# Patient Record
Sex: FEMALE | Employment: FULL TIME | URBAN - METROPOLITAN AREA
[De-identification: names, ages, dates, MRNs, and addresses within clinical notes are randomized per-mention and may not be internally consistent; named-entity substitution may affect disease eponyms.]

---

## 2017-04-19 ENCOUNTER — OFFICE VISIT (OUTPATIENT)
Dept: FAMILY MEDICINE | Facility: CLINIC | Age: 42
End: 2017-04-19

## 2017-04-19 VITALS
OXYGEN SATURATION: 100 % | HEIGHT: 62 IN | SYSTOLIC BLOOD PRESSURE: 126 MMHG | HEART RATE: 68 BPM | RESPIRATION RATE: 16 BRPM | WEIGHT: 166.2 LBS | DIASTOLIC BLOOD PRESSURE: 85 MMHG | BODY MASS INDEX: 30.59 KG/M2 | TEMPERATURE: 98.3 F

## 2017-04-19 DIAGNOSIS — R63.5 ABNORMAL WEIGHT GAIN: Primary | ICD-10-CM

## 2017-04-19 LAB — HBA1C MFR BLD: 5.1 % (ref 4.1–5.7)

## 2017-04-19 ASSESSMENT — ENCOUNTER SYMPTOMS
ROS SKIN COMMENTS: DRY SKIN
ENDOCRINE NEGATIVE: 1
APPETITE CHANGE: 0
HEMATOLOGIC/LYMPHATIC NEGATIVE: 1
RESPIRATORY NEGATIVE: 1
PSYCHIATRIC NEGATIVE: 1
NEUROLOGICAL NEGATIVE: 1
CARDIOVASCULAR NEGATIVE: 1
CONSTIPATION: 1

## 2017-04-19 NOTE — MR AVS SNAPSHOT
After Visit Summary   4/19/2017    Radha Camargo    MRN: [id number]           Patient Information     Date Of Birth          1975        Visit Information        Provider Department      4/19/2017 4:00 PM Rodrigo Durham MD \[facility]\"" [facility]        Today's Diagnoses     Abnormal weight gain    -  1      Care Instructions    Here is the plan from today's visit    1. Abnormal weight gain     - TSH with free T4 reflex  - CBC with Diff Plt ([facility]); Future  - Hemoglobin A1c (\[facility]\"")  - Comprehensive Metabolic Panel ([facility])      Please call or return to clinic if your symptoms don't go away.    Follow up plan  Please make a clinic appointment for follow up with me (RODRIGO DURHAM) in 2-4  weeks for reviewing labs.    Thank you for coming to [facility] today.  Lab Testing:  **If you had lab testing today and your results are reassuring or normal they will be mailed to you or sent through [employer] within 7 days.   **If the lab tests need quick action we will call you with the results.  The phone number we will call with results is # [phone] (home) . If this is not the best number please call our clinic and change the number.  Medication Refills:  If you need any refills please call your pharmacy and they will contact us.   If you need to  your refill at a new pharmacy, please contact the new pharmacy directly. The new pharmacy will help you get your medications transferred faster.   Scheduling:  If you have any concerns about today's visit or wish to schedule another appointment please call our office during normal business hours [phone] (8-5:00 M-F)  If a referral was made to a [facility] and you don't get a call from central scheduling please call [phone].  If a Mammogram was ordered for you at [facility] call [phone] to schedule or change your appointment.  If you had an XRay/CT/Ultrasound/MRI ordered the number is  [phone] to schedule or change your radiology appointment.   Medical Concerns:  If you have urgent medical concerns please call [phone] at any time of the day.          Follow-ups after your visit        Follow-up notes from your care team     Return in about 2 weeks (around 5/3/2017).      Future tests that were ordered for you today     Open Future Orders        Priority Expected Expires Ordered    CBC with Diff Plt ([facility]) Routine  4/19/2018 4/19/2017            Who to contact     Please call your clinic at [phone] to:    Ask questions about your health    Make or cancel appointments    Discuss your medicines    Learn about your test results    Speak to your doctor   If you have compliments or concerns about an experience at your clinic, or if you wish to file a complaint, please contact [facility] Patient Relations at [phone] or email us at [email or web address]         Additional Information About Your Visit        [employer] Information     [employer] gives you secure access to your electronic health record. If you see a primary care provider, you can also send messages to your care team and make appointments. If you have questions, please call your primary care clinic.  If you do not have a primary care provider, please call [phone] and they will assist you.      [employer] is an electronic gateway that provides easy, online access to your medical records. With [employer], you can request a clinic appointment, read your test results, renew a prescription or communicate with your care team.     To access your existing account, please contact your [facility] Clinic or call [phone] for assistance.        Care EveryWhere ID     This is your Care EveryWhere ID. This could be used by other organizations to access your [facility] medical records  [id number]        Your Vitals Were     Pulse Temperature Respirations Height Last Period  "Pulse Oximetry    68 98.3  F (36.8  C) (Oral) 16 5' 2\" (157.5 cm) 04/11/2017 100%    BMI (Body Mass Index)                   30.4 kg/m2            Blood Pressure from Last 3 Encounters:   04/19/17 126/85    Weight from Last 3 Encounters:   04/19/17 166 lb 3.2 oz (75.4 kg)              We Performed the Following     Comprehensive Metabolic Panel ([facility])     Hemoglobin A1c ([place])     TSH with free T4 reflex        Primary Care Provider Office Phone # Fax #    Rodrigo Durham [phone]       [facility] [address] [address]        Thank you!     Thank you for choosing [facility]  for your care. Our goal is always to provide you with excellent care. Hearing back from our patients is one way we can continue to improve our services. Please take a few minutes to complete the written survey that you may receive in the mail after your visit with us. Thank you!             Your Updated Medication List - Protect others around you: Learn how to safely use, store and throw away your medicines at www.disposemymeds.org.      Notice  As of 4/19/2017  9:03 PM    You have not been prescribed any medications.      "

## 2017-04-19 NOTE — PROGRESS NOTES
"      HPI:       Radha Camargo is a  41 year old who presents for the following  Patient presents with:  thyroid: thyroid check, weight gaining concern    Radha has gained about 46 lb over the past 1 year (her normal wt was 120lb) despite working out a hour/day 5 days/wk. She endorsed leg cramp, constipation, dry skin and hair and fatigue despite having enough sleep at night. She has family history of obesity and hypothyroidism.     She reported healthy diet with a lot of fish. However, she likes and eats a lot of pasta.     She also endorsed irregular and heavy periods every 2-3 weeks and excess facial fair. Denied abdominal pain.     Problem, Medication and Allergy Lists were reviewed and are current.  Patient is a new patient to this clinic and so  I reviewed/updated the Past Medical History, the Family History and the Social History.   No medications   Family History     Problem (# of Occurrences) Relation (Name,Age of Onset)    Anxiety Disorder (1) Mother    Breast Cancer (1) Mother    Colon Cancer (1) Brother    Coronary Artery Disease (1) Paternal Grandmother    DIABETES (1) Paternal Grandfather    Depression (1) Mother    Hypertension (1) Mother    Obesity (3) Mother, Maternal Grandmother, Cousin    Other Cancer (2) Mother, Sister    Thyroid Disease (2) Maternal Grandmother, Cousin    Unknown/Adopted (1) Paternal Grandfather               Review of Systems:   Review of Systems   Constitutional: Negative for appetite change.   HENT: Negative.    Respiratory: Negative.    Cardiovascular: Negative.    Gastrointestinal: Positive for constipation.   Endocrine: Negative.    Genitourinary: Negative.    Skin:        Dry skin   Neurological: Negative.    Hematological: Negative.    Psychiatric/Behavioral: Negative.              Physical Exam:   Patient Vitals for the past 24 hrs:   BP Temp Temp src Pulse Resp SpO2 Height Weight   17 1558 126/85 98.3  F (36.8  C) Oral 68 16 100 % 5' 2\" (157.5 cm) 166 lb 3.2 oz " (75.4 kg)     Body mass index is 30.4 kg/(m^2).  Vitals were reviewed and were normal     Physical Exam   Constitutional: She is oriented to person, place, and time. She appears well-developed.   HENT:   Head: Normocephalic.   Eyes: Conjunctivae are normal. Pupils are equal, round, and reactive to light. No scleral icterus.   Neck: No thyromegaly present.   Cardiovascular: Normal rate, regular rhythm and normal heart sounds.  Exam reveals no gallop.    No murmur heard.  Pulmonary/Chest: [place] normal and breath sounds normal.   Abdominal: Soft. Bowel sounds are normal. There is no tenderness.   Musculoskeletal: Normal range of motion.   Lymphadenopathy:     She has no cervical adenopathy.   Neurological: She is alert and oriented to person, place, and time. No cranial nerve deficit.   Skin: Skin is dry.   Psychiatric: She has a normal mood and affect. Judgment and thought content normal.       Results:      Results from the last 24 hours  Results for orders placed or performed in visit on 04/19/17 (from the past 24 hour(s))   Hemoglobin A1c ([place])   Result Value Ref Range    Hemoglobin A1C 5.1 4.1 - 5.7 %     Assessment and Plan     Radha was seen today for thyroid.    Diagnoses and all orders for this visit: Weight gain, constipation, dry skin and family history support the diagnosis of hypothyroidism. However weight gain can also be caused by high carbohydrate diet.     Abnormal weight gain  -     TSH with free T4 reflex  -     CBC with Diff Plt ([facility]); Future  -     Hemoglobin A1c ([facility])  -     Comprehensive Metabolic Panel ([facility])      There are no discontinued medications.  Options for treatment and follow-up care were reviewed with the patient. Radha Camargo  engaged in the decision making process and verbalized understanding of the options discussed and agreed with the final plan.    Rodrigo Durham MD

## 2017-04-19 NOTE — PROGRESS NOTES
Gain 46 lb over 1 year (normal wgt 120lb)   Leg cramp, constipation, fatigue,     Irregular period, heavy, every 2-3 weeks     LEEP for cervical cancer    Family   Obesity  Hypothyroidism (grandma, aunts)

## 2017-04-20 LAB
ALBUMIN SERPL-MCNC: 4.1 MG/DL (ref 3.8–5)
ALP SERPL-CCNC: 62.2 U/L (ref 31.7–110.5)
ALT SERPL-CCNC: 12.4 U/L (ref 0–45)
AST SERPL-CCNC: 9.9 U/L (ref 0–45)
BILIRUB SERPL-MCNC: 0.5 MG/DL (ref 0.2–1.3)
BUN SERPL-MCNC: 20.5 MG/DL (ref 7–19)
CALCIUM SERPL-MCNC: 9.4 MG/DL (ref 8.5–10.1)
CHLORIDE SERPLBLD-SCNC: 103.3 MMOL/L (ref 98–110)
CO2 SERPL-SCNC: 24.7 MMOL/L (ref 20–32)
CREAT SERPL-MCNC: 0.8 MG/DL (ref 0.5–1)
GFR SERPL CREATININE-BSD FRML MDRD: 84.9 ML/MIN/1.7 M2
GLUCOSE SERPL-MCNC: 87 MG'DL (ref 70–99)
POTASSIUM SERPL-SCNC: 4.1 MMOL/DL (ref 3.3–4.5)
PROT SERPL-MCNC: 7.2 G/DL (ref 6.8–8.8)
SODIUM SERPL-SCNC: 137.3 MMOL/L (ref 132.6–141.4)
TSH SERPL DL<=0.005 MIU/L-ACNC: 2.5 MU/L (ref 0.4–4)

## 2017-04-20 NOTE — PATIENT INSTRUCTIONS
Here is the plan from today's visit    1. Abnormal weight gain     - TSH with free T4 reflex  - CBC with Diff Plt ([facility]); Future  - Hemoglobin A1c ([place])  - Comprehensive Metabolic Panel ([facility])      Please call or return to clinic if your symptoms don't go away.    Follow up plan  Please make a clinic appointment for follow up with me (OSBALDO GARZA) in 2-4  weeks for reviewing labs.    Thank you for coming to [facility] today.  Lab Testing:  **If you had lab testing today and your results are reassuring or normal they will be mailed to you or sent through [employer] within 7 days.   **If the lab tests need quick action we will call you with the results.  The phone number we will call with results is # [phone] (home) . If this is not the best number please call our clinic and change the number.  Medication Refills:  If you need any refills please call your pharmacy and they will contact us.   If you need to  your refill at a new pharmacy, please contact the new pharmacy directly. The new pharmacy will help you get your medications transferred faster.   Scheduling:  If you have any concerns about today's visit or wish to schedule another appointment please call our office during normal business hours [phone] (8-5:00 M-F)  If a referral was made to a [facility] and you don't get a call from central scheduling please call [phone].  If a Mammogram was ordered for you at [facility] call [phone] to schedule or change your appointment.  If you had an XRay/CT/Ultrasound/MRI ordered the number is [phone] to schedule or change your radiology appointment.   Medical Concerns:  If you have urgent medical concerns please call [phone] at any time of the day.

## 2017-12-08 ENCOUNTER — TRANSFERRED RECORDS (OUTPATIENT)
Dept: HEALTH INFORMATION MANAGEMENT | Facility: CLINIC | Age: 42
End: 2017-12-08

## 2018-01-21 ENCOUNTER — HEALTH MAINTENANCE LETTER (OUTPATIENT)
Age: 43
End: 2018-01-21

## 2018-03-12 ENCOUNTER — OFFICE VISIT (OUTPATIENT)
Dept: FAMILY MEDICINE | Facility: CLINIC | Age: 43
End: 2018-03-12
Payer: COMMERCIAL

## 2018-03-12 VITALS
SYSTOLIC BLOOD PRESSURE: 128 MMHG | DIASTOLIC BLOOD PRESSURE: 83 MMHG | TEMPERATURE: 98.1 F | BODY MASS INDEX: 32.19 KG/M2 | WEIGHT: 176 LBS | OXYGEN SATURATION: 97 % | HEART RATE: 68 BPM

## 2018-03-12 DIAGNOSIS — R63.5 ABNORMAL WEIGHT GAIN: ICD-10-CM

## 2018-03-12 DIAGNOSIS — N92.6 IRREGULAR MENSES: ICD-10-CM

## 2018-03-12 DIAGNOSIS — N92.1 METRORRHAGIA: ICD-10-CM

## 2018-03-12 DIAGNOSIS — L65.9 HAIR LOSS: ICD-10-CM

## 2018-03-12 DIAGNOSIS — R53.83 FATIGUE, UNSPECIFIED TYPE: ICD-10-CM

## 2018-03-12 DIAGNOSIS — Z00.00 PREVENTATIVE HEALTH CARE: Primary | ICD-10-CM

## 2018-03-12 NOTE — PROGRESS NOTES
HPI:       Radha Camargo is a 42 year old who presents for the following  Patient presents with:  Weight Problem: Has gained weight over the last year and a half and also has joint pain for the same amount of time.  Also has had a sore throat for the last 8 months.      Menarche age 12, initially irregular, was on OCPs, then was regular  Irregular since June, heavy bleeding.  Lasting about 7-10 days. Passing large clots.     Patient's mom had complete hysterectomy at age 30 due to uterine and ovarian cancer. Mom is currently in remission for breast cancer, diagnosed at age 61.     Patient reports that she has gained quite a bit of weight over the last year and a half.  She denies any change in her diet, activity, or medications.  She states that she has been trying to be more active and working out with 1 of her sons who is currently in wrestling.  Does state that she has also been extremely tired over the last year as well.  Denies any change in medications.  also reports that she has been having significant hair loss which is very stressful to her.    Problem, Medication and Allergy Lists were   reviewed and are current.   There are no active problems to display for this patient.  ,     Current Outpatient Prescriptions   Medication Sig Dispense Refill     IBUPROFEN PO Take 400 mg by mouth once     ,     Allergies   Allergen Reactions     Seasonal Allergies      Patient is an established patient of this clinic.         Review of Systems:   Review of Systems   Constitutional: Positive for fatigue and unexpected weight change (increased). Negative for activity change and appetite change.   HENT: Negative for congestion.    Eyes: Negative for visual disturbance.   Respiratory: Negative for cough, shortness of breath and wheezing.    Cardiovascular: Negative for chest pain, palpitations and leg swelling.   Gastrointestinal: Negative for abdominal distention, abdominal pain, constipation, diarrhea and nausea.    Endocrine: Negative for cold intolerance, heat intolerance, polydipsia, polyphagia and polyuria.   Genitourinary: Positive for vaginal bleeding (irregular menses). Negative for frequency, hematuria and urgency.   Musculoskeletal: Negative for arthralgias and myalgias.   Skin: Negative for rash.        Dry skin, coarse hair, hair loss   Neurological: Negative for weakness and numbness.   Psychiatric/Behavioral: Positive for sleep disturbance.             Physical Exam:   Patient Vitals for the past 24 hrs:   BP Temp Temp src Pulse SpO2 Weight   03/12/18 1555 128/83 98.1  F (36.7  C) Oral 68 97 % 176 lb (79.8 kg)     Body mass index is 32.19 kg/(m^2).  Vitals were reviewed and were normal     Physical Exam   Constitutional: She is oriented to person, place, and time. She appears well-developed. No distress.   HENT:   Head: Normocephalic.   Nose: Nose normal.   Mouth/Throat: Oropharynx is clear and moist. No oropharyngeal exudate.   Eyes: Conjunctivae and EOM are normal. Pupils are equal, round, and reactive to light. No scleral icterus.   Neck: Normal range of motion. Neck supple. No thyromegaly present.   Cardiovascular: Normal rate, regular rhythm, normal heart sounds and intact distal pulses.    No murmur heard.  Pulmonary/Chest: [place] normal and breath sounds normal. No respiratory distress. She has no wheezes.   Abdominal: Soft. Bowel sounds are normal. She exhibits no distension. There is no splenomegaly or hepatomegaly. There is no tenderness.   Musculoskeletal: She exhibits no edema.   Lymphadenopathy:     She has no cervical adenopathy.   Neurological: She is alert and oriented to person, place, and time. She displays normal reflexes. She exhibits normal muscle tone.   Skin: Skin is warm and dry. No rash noted. She is not diaphoretic.   Dry coarse hair, dry flaky skin   Psychiatric: She has a normal mood and affect.   Vitals reviewed.        Results:     Results for orders placed or performed in visit on  03/12/18   Follicle stimulating hormone   Result Value Ref Range    FSH 13.0 IU/L   TSH with free T4 reflex   Result Value Ref Range    TSH 2.68 0.40 - 4.00 mU/L   CBC with platelets differential   Result Value Ref Range    WBC 6.8 4.0 - 11.0 10e9/L    RBC Count 3.66 (L) 3.8 - 5.2 10e12/L    Hemoglobin 10.9 (L) 11.7 - 15.7 g/dL    Hematocrit 33.0 (L) 35.0 - 47.0 %    MCV 90 78 - 100 fl    MCH 29.8 26.5 - 33.0 pg    MCHC 33.0 31.5 - 36.5 g/dL    RDW 13.3 10.0 - 15.0 %    Platelet Count 298 150 - 450 10e9/L    Diff Method Automated Method     % Neutrophils 63.8 %    % Lymphocytes 29.6 %    % Monocytes 5.2 %    % Eosinophils 0.9 %    % Basophils 0.4 %    % Immature Granulocytes 0.1 %    Nucleated RBCs 0 0 /100    Absolute Neutrophil 4.3 1.6 - 8.3 10e9/L    Absolute Lymphocytes 2.0 0.8 - 5.3 10e9/L    Absolute Monocytes 0.4 0.0 - 1.3 10e9/L    Absolute Eosinophils 0.1 0.0 - 0.7 10e9/L    Absolute Basophils 0.0 0.0 - 0.2 10e9/L    Abs Immature Granulocytes 0.0 0 - 0.4 10e9/L    Absolute Nucleated RBC 0.0    IRON AND IRON BINDING CAPACITY   Result Value Ref Range    Iron 28 (L) 35 - 180 ug/dL    Iron Binding Cap 331 240 - 430 ug/dL    Iron Saturation Index 8 (L) 15 - 46 %   FERRITIN   Result Value Ref Range    Ferritin 8 (L) 12 - 150 ng/mL   Vitamin D Level   Result Value Ref Range    Vitamin D Deficiency screening 12 (L) 20 - 75 ug/L         Assessment and Plan     Radha is a 43yo previously healthy woman who presents to the clinic with fatigue, irregular periods, unexpected weight gain, and hair loss.  Her  TSH is within normal limits, FSH does not show that she is not postmenopausal. Her labs show that she is iron deficient with anemia and vitamin D deficiency.  Her hair loss could be due to her iron deficiency and stress.  Will likely need to do further tests as her FSH is within normal limits.  Could consider getting LH, estradiol, prolactin at follow-up visit.  As patient has a significant family history of uterine  and ovarian cancer discussed with patient that she will need a pelvic ultrasound.  Will follow up with patient to discuss labs and ultrasound finding and discuss further workup of her symptoms.  She would likely benefit from 50,000 units of vitamin D for 8 weeks, and iron supplementation.  Will likely need adequate bowel regimen when starting iron supplements.    1. Preventative health care  - ADMIN VACCINE, INITIAL  - TDAP VACCINE (BOOSTRIX)    2. Irregular menses  - Follicle stimulating hormone  - TSH with free T4 reflex  - US Pel W/Trans*; Future    3. Metrorrhagia  - CBC with platelets differential  - US Pel W/Trans*; Future    4. Hair loss  - IRON AND IRON BINDING CAPACITY  - FERRITIN    5. Abnormal weight gain  6. Fatigue, unspecified type  - TSH with free T4 reflex  - CBC with platelets differential  - Vitamin D Level    There are no discontinued medications.  Options for treatment and follow-up care were reviewed with the patient. Radha Camargo  engaged in the decision making process and verbalized understanding of the options discussed and agreed with the final plan.    Samaria Mackey MD  [facility]  [phone]

## 2018-03-12 NOTE — PATIENT INSTRUCTIONS
Here is the plan from today's visit    1. Preventative health care  - ADMIN VACCINE, INITIAL  - TDAP VACCINE (BOOSTRIX)    2. Irregular menses  - Follicle stimulating hormone  - TSH with free T4 reflex  - US Pel W/Trans*; Future    3. Metrorrhagia  - CBC with platelets differential  - US Pel W/Trans*; Future    4. Hair loss  - IRON  - IRON AND IRON BINDING CAPACITY  - FERRITIN    5. Abnormal weight gain    6. Fatigue, unspecified type  - TSH with free T4 reflex  - CBC with platelets differential  - Vitamin D Level      Please call or return to clinic if your symptoms don't go away.    Follow up plan  Please make a clinic appointment for follow up with me (ADRI HART) in 1-2  weeks for follow up.    Thank you for coming to [facility] today.  Lab Testing:  **If you had lab testing today and your results are reassuring or normal they will be mailed to you or sent through [employer] within 7 days.   **If the lab tests need quick action we will call you with the results.  The phone number we will call with results is # [phone] (home) . If this is not the best number please call our clinic and change the number.  Medication Refills:  If you need any refills please call your pharmacy and they will contact us.   If you need to  your refill at a new pharmacy, please contact the new pharmacy directly. The new pharmacy will help you get your medications transferred faster.   Scheduling:  If you have any concerns about today's visit or wish to schedule another appointment please call our office during normal business hours [phone] (8-5:00 M-F)  If a referral was made to a [facility] and you don't get a call from central scheduling please call [phone].  If a Mammogram was ordered for you at [facility] call [phone] to schedule or change your appointment.  If you had an XRay/CT/Ultrasound/MRI ordered the number is [phone] to schedule or change your  radiology appointment.   Medical Concerns:  If you have urgent medical concerns please call [phone] at any time of the day.    Samaria Mackey MD

## 2018-03-12 NOTE — PROGRESS NOTES
Preceptor Attestation:  Patient seen and discussed with the resident. Assessment and plan reviewed with resident and agreed upon.  Supervising Physician:  [facility]

## 2018-03-12 NOTE — MR AVS SNAPSHOT
After Visit Summary   3/12/2018    Radha Camargo    MRN: [id number]           Patient Information     Date Of Birth          1975        Visit Information        Provider Department      3/12/2018 4:00 PM Samaria Mackey MD [facility]        Today's Diagnoses     Preventative health care    -  1    Irregular menses        Metrorrhagia        Hair loss        Abnormal weight gain        Fatigue, unspecified type          Care Instructions    Here is the plan from today's visit    1. Preventative health care  - ADMIN VACCINE, INITIAL  - TDAP VACCINE (BOOSTRIX)    2. Irregular menses  - Follicle stimulating hormone  - TSH with free T4 reflex  - US Pel W/Trans*; Future    3. Metrorrhagia  - CBC with platelets differential  - US Pel W/Trans*; Future    4. Hair loss  - IRON  - IRON AND IRON BINDING CAPACITY  - FERRITIN    5. Abnormal weight gain    6. Fatigue, unspecified type  - TSH with free T4 reflex  - CBC with platelets differential  - Vitamin D Level      Please call or return to clinic if your symptoms don't go away.    Follow up plan  Please make a clinic appointment for follow up with me (SAMARIA MACKEY) in 1-2  weeks for follow up.    Thank you for coming to [facility] today.  Lab Testing:  **If you had lab testing today and your results are reassuring or normal they will be mailed to you or sent through Precog within 7 days.   **If the lab tests need quick action we will call you with the results.  The phone number we will call with results is # [phone] (home) . If this is not the best number please call our clinic and change the number.  Medication Refills:  If you need any refills please call your pharmacy and they will contact us.   If you need to  your refill at a new pharmacy, please contact the new pharmacy directly. The new pharmacy will help you get your medications transferred faster.   Scheduling:  If you have any concerns about  today's visit or wish to schedule another appointment please call our office during normal business hours [phone] (8-5:00 M-F)  If a referral was made to a [facility] and you don't get a call from central scheduling please call [phone].  If a Mammogram was ordered for you at [facility] call [phone] to schedule or change your appointment.  If you had an XRay/CT/Ultrasound/MRI ordered the number is [phone] to schedule or change your radiology appointment.   Medical Concerns:  If you have urgent medical concerns please call [phone] at any time of the day.    Samaria Mackey MD             Follow-ups after your visit        Your next 10 appointments already scheduled     Mar 23, 2018  4:00 PM CDT   PHYSICAL with Rodrigo Durham MD   [facility]    [address] [place] [address],  [address]  [place] [address]   [phone]              Future tests that were ordered for you today     Open Future Orders        Priority Expected Expires Ordered    US Pel W/Trans* Routine  3/12/2019 3/12/2018            Who to contact     Please call your clinic at [phone] to:    Ask questions about your health    Make or cancel appointments    Discuss your medicines    Learn about your test results    Speak to your doctor            Additional Information About Your Visit        [employer] Information     [employer] gives you secure access to your electronic health record. If you see a primary care provider, you can also send messages to your care team and make appointments. If you have questions, please call your primary care clinic.  If you do not have a primary care provider, please call [phone] and they will assist you.      [employer] is an electronic gateway that provides easy, online access to your medical records. With [employer], you can request a clinic appointment, read your test results, renew a prescription or communicate with your  care team.     To access your existing account, please contact your [facility] or call [phone] for assistance.        Care EveryWhere ID     This is your Care EveryWhere ID. This could be used by other organizations to access your [place] medical records  [id number]        Your Vitals Were     Pulse Temperature Last Period Pulse Oximetry Breastfeeding? BMI (Body Mass Index)    68 98.1  F (36.7  C) (Oral) 03/10/2018 97% No 32.19 kg/m2       Blood Pressure from Last 3 Encounters:   03/12/18 128/83   04/19/17 126/85    Weight from Last 3 Encounters:   03/12/18 176 lb (79.8 kg)   04/19/17 166 lb 3.2 oz (75.4 kg)              We Performed the Following     ADMIN VACCINE, INITIAL     CBC with platelets differential     FERRITIN     Follicle stimulating hormone     IRON AND IRON BINDING CAPACITY     IRON     TDAP VACCINE (BOOSTRIX)     TSH with free T4 reflex     Vitamin D Level        Primary Care Provider Office Phone # Fax #    Rodrigo Durham [phone]       [address] [address]        Equal Access to Services     [facility] CenterSTEPHANIE : Hadii cici singh hadasho Soodilia, waaxda luqadaha, qaybta kaalmada aniceto, tl pascual . So [facility] [phone].    ATENCIÓN: Si habla español, tiene a mueller disposición servicios gratuitos de asistencia lingüística. Rod al [phone].    We comply with applicable federal civil rights laws and [place] laws. We do not discriminate on the basis of race, color, national origin, age, disability, sex, sexual orientation, or gender identity.            Thank you!     Thank you for choosing [facility]  for your care. Our goal is always to provide you with excellent care. Hearing back from our patients is one way we can continue to improve our services. Please take a few minutes to complete the written survey that you may receive in the mail after your visit with us.  Thank you!             Your Updated Medication List - Protect others around you: Learn how to safely use, store and throw away your medicines at www.disposemymeds.org.          This list is accurate as of 3/12/18  4:48 PM.  Always use your most recent med list.                   Brand Name Dispense Instructions for use Diagnosis    IBUPROFEN PO      Take 400 mg by mouth once

## 2018-03-13 PROBLEM — L65.9 HAIR LOSS: Status: ACTIVE | Noted: 2018-03-13

## 2018-03-13 PROBLEM — N92.1 METRORRHAGIA: Status: ACTIVE | Noted: 2018-03-13

## 2018-03-13 PROBLEM — R63.5 ABNORMAL WEIGHT GAIN: Status: ACTIVE | Noted: 2018-03-13

## 2018-03-13 PROBLEM — R53.83 FATIGUE, UNSPECIFIED TYPE: Status: ACTIVE | Noted: 2018-03-13

## 2018-03-13 PROBLEM — N92.6 IRREGULAR MENSES: Status: ACTIVE | Noted: 2018-03-13

## 2018-03-13 LAB
BASOPHILS # BLD AUTO: 0 10E9/L (ref 0–0.2)
BASOPHILS NFR BLD AUTO: 0.4 %
DEPRECATED CALCIDIOL+CALCIFEROL SERPL-MC: 12 UG/L (ref 20–75)
DIFFERENTIAL METHOD BLD: ABNORMAL
EOSINOPHIL # BLD AUTO: 0.1 10E9/L (ref 0–0.7)
EOSINOPHIL NFR BLD AUTO: 0.9 %
ERYTHROCYTE [DISTWIDTH] IN BLOOD BY AUTOMATED COUNT: 13.3 % (ref 10–15)
FERRITIN SERPL-MCNC: 8 NG/ML (ref 12–150)
FSH SERPL-ACNC: 13 IU/L
HCT VFR BLD AUTO: 33 % (ref 35–47)
HGB BLD-MCNC: 10.9 G/DL (ref 11.7–15.7)
IMM GRANULOCYTES # BLD: 0 10E9/L (ref 0–0.4)
IMM GRANULOCYTES NFR BLD: 0.1 %
IRON SATN MFR SERPL: 8 % (ref 15–46)
IRON SERPL-MCNC: 28 UG/DL (ref 35–180)
LYMPHOCYTES # BLD AUTO: 2 10E9/L (ref 0.8–5.3)
LYMPHOCYTES NFR BLD AUTO: 29.6 %
MCH RBC QN AUTO: 29.8 PG (ref 26.5–33)
MCHC RBC AUTO-ENTMCNC: 33 G/DL (ref 31.5–36.5)
MCV RBC AUTO: 90 FL (ref 78–100)
MONOCYTES # BLD AUTO: 0.4 10E9/L (ref 0–1.3)
MONOCYTES NFR BLD AUTO: 5.2 %
NEUTROPHILS # BLD AUTO: 4.3 10E9/L (ref 1.6–8.3)
NEUTROPHILS NFR BLD AUTO: 63.8 %
NRBC # BLD AUTO: 0 10*3/UL
NRBC BLD AUTO-RTO: 0 /100
PLATELET # BLD AUTO: 298 10E9/L (ref 150–450)
RBC # BLD AUTO: 3.66 10E12/L (ref 3.8–5.2)
TIBC SERPL-MCNC: 331 UG/DL (ref 240–430)
TSH SERPL DL<=0.005 MIU/L-ACNC: 2.68 MU/L (ref 0.4–4)
WBC # BLD AUTO: 6.8 10E9/L (ref 4–11)

## 2018-03-13 ASSESSMENT — ENCOUNTER SYMPTOMS
SHORTNESS OF BREATH: 0
NAUSEA: 0
PALPITATIONS: 0
UNEXPECTED WEIGHT CHANGE: 1
CONSTIPATION: 0
APPETITE CHANGE: 0
COUGH: 0
POLYDIPSIA: 0
ABDOMINAL DISTENTION: 0
WEAKNESS: 0
FREQUENCY: 0
ACTIVITY CHANGE: 0
ABDOMINAL PAIN: 0
POLYPHAGIA: 0
SLEEP DISTURBANCE: 1
FATIGUE: 1
ARTHRALGIAS: 0
DIARRHEA: 0
NUMBNESS: 0
HEMATURIA: 0
WHEEZING: 0
MYALGIAS: 0

## 2018-03-14 DIAGNOSIS — D50.9 IRON DEFICIENCY ANEMIA, UNSPECIFIED IRON DEFICIENCY ANEMIA TYPE: Primary | ICD-10-CM

## 2018-03-14 DIAGNOSIS — E55.9 VITAMIN D DEFICIENCY: ICD-10-CM

## 2018-03-14 RX ORDER — FERROUS SULFATE 325(65) MG
325 TABLET ORAL
Qty: 30 TABLET | Refills: 2 | Status: SHIPPED | OUTPATIENT
Start: 2018-03-14

## 2018-03-15 DIAGNOSIS — E55.9 VITAMIN D DEFICIENCY: ICD-10-CM

## 2018-03-15 NOTE — TELEPHONE ENCOUNTER
Request for medication refill:    Date of last visit at clinic: 03/12/2018    Please complete refill if appropriate and CLOSE ENCOUNTER.    Closing the encounter signifies the refill is complete.    If refill has been denied, please complete the smart phrase .smirefuse and route it to the [facility] RN TRIAGE pool to inform the patient and the pharmacy.    Stevenson John, CMA

## 2018-03-16 ENCOUNTER — RADIANT APPOINTMENT (OUTPATIENT)
Dept: ULTRASOUND IMAGING | Facility: CLINIC | Age: 43
End: 2018-03-16
Attending: FAMILY MEDICINE
Payer: COMMERCIAL

## 2018-03-16 DIAGNOSIS — N92.6 IRREGULAR MENSES: ICD-10-CM

## 2018-03-16 DIAGNOSIS — N92.1 METRORRHAGIA: ICD-10-CM

## 2018-03-23 ENCOUNTER — OFFICE VISIT (OUTPATIENT)
Dept: FAMILY MEDICINE | Facility: CLINIC | Age: 43
End: 2018-03-23
Payer: COMMERCIAL

## 2018-03-23 VITALS
BODY MASS INDEX: 31.79 KG/M2 | WEIGHT: 173.8 LBS | DIASTOLIC BLOOD PRESSURE: 85 MMHG | HEART RATE: 75 BPM | TEMPERATURE: 98.1 F | OXYGEN SATURATION: 100 % | SYSTOLIC BLOOD PRESSURE: 125 MMHG

## 2018-03-23 DIAGNOSIS — Z00.00 ROUTINE GENERAL MEDICAL EXAMINATION AT A HEALTH CARE FACILITY: Primary | ICD-10-CM

## 2018-03-23 DIAGNOSIS — N92.1 MENORRHAGIA WITH IRREGULAR CYCLE: ICD-10-CM

## 2018-03-23 DIAGNOSIS — J30.89 CHRONIC NON-SEASONAL ALLERGIC RHINITIS, UNSPECIFIED TRIGGER: ICD-10-CM

## 2018-03-23 RX ORDER — FLUTICASONE PROPIONATE 50 MCG
1-2 SPRAY, SUSPENSION (ML) NASAL DAILY
Qty: 16 G | Refills: 6 | Status: SHIPPED | OUTPATIENT
Start: 2018-03-23 | End: 2018-10-23

## 2018-03-23 RX ORDER — LORATADINE 10 MG/1
10 TABLET ORAL DAILY
Qty: 90 TABLET | Refills: 1 | Status: SHIPPED | OUTPATIENT
Start: 2018-03-23 | End: 2018-10-23

## 2018-03-23 NOTE — PATIENT INSTRUCTIONS
Here is the plan from today's visit    1. Routine general medical examination at a health care facility    - Pap imaged thin layer screen with HPV - recommended age 30 - 65 years (select HPV order below)    2. Chronic non-seasonal allergic rhinitis, unspecified trigger  If still sore and hoarse in 2 months then message me  - fluticasone (FLONASE) 50 MCG/ACT spray; Spray 1-2 sprays into both nostrils daily  Dispense: 16 g; Refill: 6  - loratadine (CLARITIN) 10 MG tablet; Take 1 tablet (10 mg) by mouth daily  Dispense: 90 tablet; Refill: 1    3. Menorrhagia with irregular cycle  Make an appointment for the Mirena  - [facility]poscopy/Gynecology Clinic[facility]      Please call or return to clinic if your symptoms don't go away.    Follow up plan  For Vit D test and hgb in 6 weeks  Thank you for coming to [facility] today.  Lab Testing:  **If you had lab testing today and your results are reassuring or normal they will be mailed to you or sent through Easel within 7 days.   **If the lab tests need quick action we will call you with the results.  The phone number we will call with results is # [phone] (home) . If this is not the best number please call our clinic and change the number.  Medication Refills:  If you need any refills please call your pharmacy and they will contact us.   If you need to  your refill at a new pharmacy, please contact the new pharmacy directly. The new pharmacy will help you get your medications transferred faster.   Scheduling:  If you have any concerns about today's visit or wish to schedule another appointment please call our office during normal business hours [phone] (8-5:00 M-F)  If a referral was made to a [facility] and you don't get a call from central scheduling please call [phone].  If a Mammogram was ordered for you at [facility] call [phone] to schedule or change your appointment.  If you had an  XRay/CT/Ultrasound/MRI ordered the number is [phone] to schedule or change your radiology appointment.   Medical Concerns:  If you have urgent medical concerns please call [phone] at any time of the day.    Rodrigo Durham MD

## 2018-03-23 NOTE — MR AVS SNAPSHOT
After Visit Summary   3/23/2018    Radha Camargo    MRN: [id number]           Patient Information     Date Of Birth          1975        Visit Information        Provider Department      3/23/2018 4:00 PM Rodrigo Durham MD [facility]        Today's Diagnoses     Routine general medical examination at a health care facility    -  1    Chronic non-seasonal allergic rhinitis, unspecified trigger        Menorrhagia with irregular cycle          Care Instructions    Here is the plan from today's visit    1. Routine general medical examination at a health care facility    - Pap imaged thin layer screen with HPV - recommended age 30 - 65 years (select HPV order below)    2. Chronic non-seasonal allergic rhinitis, unspecified trigger  If still sore and hoarse in 2 months then message me  - fluticasone (FLONASE) 50 MCG/ACT spray; Spray 1-2 sprays into both nostrils daily  Dispense: 16 g; Refill: 6  - loratadine (CLARITIN) 10 MG tablet; Take 1 tablet (10 mg) by mouth daily  Dispense: 90 tablet; Refill: 1    3. Menorrhagia with irregular cycle  Make an appointment for the Mirena  - Colposcopy/Gynecology Clinic[facility]      Please call or return to clinic if your symptoms don't go away.    Follow up plan  For Vit D test and hgb in 6 weeks  Thank you for coming to [facility] today.  Lab Testing:  **If you had lab testing today and your results are reassuring or normal they will be mailed to you or sent through [employer] within 7 days.   **If the lab tests need quick action we will call you with the results.  The phone number we will call with results is # [phone] (home) . If this is not the best number please call our clinic and change the number.  Medication Refills:  If you need any refills please call your pharmacy and they will contact us.   If you need to  your refill at a new pharmacy, please contact the new pharmacy directly. The new pharmacy will  help you get your medications transferred faster.   Scheduling:  If you have any concerns about today's visit or wish to schedule another appointment please call our office during normal business hours [phone] (8-5:00 M-F)  If a referral was made to a [facility] and you don't get a call from central scheduling please call [phone].  If a Mammogram was ordered for you at [facility] call [phone] to schedule or change your appointment.  If you had an XRay/CT/Ultrasound/MRI ordered the number is [phone] to schedule or change your radiology appointment.   Medical Concerns:  If you have urgent medical concerns please call [phone] at any time of the day.    Rodrigo Durham MD            Follow-ups after your visit        Additional Services     [facility]       What procedure: Mirena placement  Urgency of Appointment: Next Available  Would this patient benefit from pre-medication with Ativan for procedural anxiety? No  Is this patient post-menopausal? No  Had mirena before and controlled the bleeding it was removed and menorrhagia returned.                  Follow-up notes from your care team     Return in about 4 weeks (around 4/20/2018) for Lab Work-Vit d and hgb.      Who to contact     Please call your clinic at [phone] to:    Ask questions about your health    Make or cancel appointments    Discuss your medicines    Learn about your test results    Speak to your doctor            Additional Information About Your Visit        T[employer]im[employer] Information     [employer] gives you secure access to your electronic health record. If you see a primary care provider, you can also send messages to your care team and make appointments. If you have questions, please call your primary care clinic.  If you do not have a primary care provider, please call [phone] and they will assist you.      Infor[employer] is an electronic gateway that  provides easy, online access to your medical records. With TCHO, you can request a clinic appointment, read your test results, renew a prescription or communicate with your care team.     To access your existing account, please contact your [facility] or call [phone] for assistance.        Care EveryWhere ID     This is your Care EveryWhere ID. This could be used by other organizations to access your [place] medical records  [id number]        Your Vitals Were     Pulse Temperature Last Period Pulse Oximetry BMI (Body Mass Index)       75 98.1  F (36.7  C) (Oral) 03/10/2018 100% 31.79 kg/m2        Blood Pressure from Last 3 Encounters:   03/23/18 125/85   03/12/18 128/83   04/19/17 126/85    Weight from Last 3 Encounters:   03/23/18 173 lb 12.8 oz (78.8 kg)   03/12/18 176 lb (79.8 kg)   04/19/17 166 lb 3.2 oz (75.4 kg)              We Performed the Following     Colposcopy/Gynecology Clinic[facility]     HPV High Risk Types DNA Cervical     Pap imaged thin layer screen with HPV - recommended age 30 - 65 years (select HPV order below)          Today's Medication Changes          These changes are accurate as of 3/23/18 11:59 PM.  If you have any questions, ask your nurse or doctor.               Start taking these medicines.        Dose/Directions    fluticasone 50 MCG/ACT spray   Commonly known as:  FLONASE   Used for:  Chronic non-seasonal allergic rhinitis, unspecified trigger   Started by:  Rodrigo Durham MD        Dose:  1-2 spray   Spray 1-2 sprays into both nostrils daily   Quantity:  16 g   Refills:  6       loratadine 10 MG tablet   Commonly known as:  CLARITIN   Used for:  Chronic non-seasonal allergic rhinitis, unspecified trigger   Started by:  Rodrigo Durham MD        Dose:  10 mg   Take 1 tablet (10 mg) by mouth daily   Quantity:  90 tablet   Refills:  1            Where to get your medicines      These medications were sent to [facility] [place] [address] AT [address] [place], [place] [address]     Phone:  [phone]     fluticasone 50 MCG/ACT spray    loratadine 10 MG tablet                Primary Care Provider Office Phone # Fax #    Rodrigo Druham [phone]       [address] [address]        Equal Access to Services     SAV BOOTH : Hadii aad ku hadasho Soomaali, waaxda luqadaha, qaybta kaalmada adeegyada, waxay idiin hayaan adeeg kharaada la'livier . So [facility] [phone].    ATENCIÓN: Si habla español, tiene a mueller disposición servicios gratuitos de asistencia lingüística. [facility] [phone].    We comply with applicable federal civil rights laws and [place] laws. We do not discriminate on the basis of race, color, national origin, age, disability, sex, sexual orientation, or gender identity.            Thank you!     Thank you for choosing [facility]  for your care. Our goal is always to provide you with excellent care. Hearing back from our patients is one way we can continue to improve our services. Please take a few minutes to complete the written survey that you may receive in the mail after your visit with us. Thank you!             Your Updated Medication List - Protect others around you: Learn how to safely use, store and throw away your medicines at www.disposemymeds.org.          This list is accurate as of 3/23/18 11:59 PM.  Always use your most recent med list.                   Brand Name Dispense Instructions for use Diagnosis    cholecalciferol 57254 UNITS capsule    VITAMIN D3    8 capsule    Take 1 capsule (50,000 Units) by mouth once a week    Vitamin D deficiency       ferrous sulfate 325 (65 FE) MG tablet    IRON    30 tablet    Take 1 tablet (325 mg) by mouth daily (with breakfast)    Iron deficiency anemia, unspecified iron deficiency anemia type       fluticasone 50 MCG/ACT spray    FLONASE     16 g    Spray 1-2 sprays into both nostrils daily    Chronic non-seasonal allergic rhinitis, unspecified trigger       IBUPROFEN PO      Take 400 mg by mouth once        loratadine 10 MG tablet    CLARITIN    90 tablet    Take 1 tablet (10 mg) by mouth daily    Chronic non-seasonal allergic rhinitis, unspecified trigger

## 2018-03-23 NOTE — PROGRESS NOTES
"  Female Physical Note          HPI         Concerns today: {[facility] CONCERNS:519611}      Patient Active Problem List   Diagnosis     Irregular menses     Metrorrhagia     Hair loss     Abnormal weight gain     Fatigue, unspecified type       History reviewed. No pertinent past medical history.    Previous Medical Care   ***     Family History   Problem Relation Age of Onset     Breast Cancer Mother 61     Other Cancer Mother 30     ovarian     Hypertension Mother      Anxiety Disorder Mother      Depression Mother      Obesity Mother      Thyroid Disease Maternal Grandmother      Obesity Maternal Grandmother      Coronary Artery Disease Paternal Grandmother      DIABETES Paternal Grandfather      Unknown/Adopted Paternal Grandfather      Colon Cancer Brother      Other Cancer Sister      Thyroid Disease Cousin      Obesity Cousin               Review of Systems:     Review of Systems:  {ROS NORMAL:921699::\"CONSTITUTIONAL: NEGATIVE for fever, chills, change in weight\",\"INTEGUMENTARY/SKIN: NEGATIVE for worrisome rashes, moles or lesions\",\"EYES: NEGATIVE for vision changes or irritation\",\"ENT/MOUTH: NEGATIVE for ear, mouth and throat problems\",\"RESP: NEGATIVE for significant cough or SOB\",\"BREAST: NEGATIVE for masses, tenderness or discharge\",\"CV: NEGATIVE for chest pain, palpitations or peripheral edema\",\"GI: NEGATIVE for nausea, abdominal pain, heartburn, or change in bowel habits\",\": NEGATIVE for frequency, dysuria, or hematuria\",\"MUSCULOSKELETAL: NEGATIVE for significant arthralgias or myalgia\",\"NEURO: NEGATIVE for weakness, dizziness or paresthesias\",\"ENDOCRINE: NEGATIVE for temperature intolerance, skin/hair changes\",\"HEME/ALLERGY: NEGATIVE for bleeding problems\",\"PSYCHIATRIC: NEGATIVE for changes in mood or affect\"}  Sleep:   Do you snore most or the night (as reported by a family member)? {NO IS DEFAULT/YES:42038422::\"No\"}  Do you feel sleepy or extremely tired during most of the day? {NO IS " "DEFAULT/YES:29144512::\"No\"}  {If both questions are answered with \"yes\" consider evaluating the patient for STONE with the dot phrase \".smics\" either this visit or at a follow up visit }           Social History     Social History     Social History     Marital status:      Spouse name: N/A     Number of children: N/A     Years of education: N/A     Occupational History     Not on file.     Social History Main Topics     Smoking status: Never Smoker     Smokeless tobacco: Never Used     Alcohol use No     Drug use: No     Sexual activity: Yes     Other Topics Concern     Not on file     Social History Narrative       Marital Status:{MARITAL STATUS:674235}  Who lives in your household? ***    Has anyone hurt you physically, for example by pushing, hitting, slapping or kicking you or forcing you to have sex? {[facility] DENIES:224093::\"Denies\"}  Do you feel threatened or controlled by a partner, ex-partner or anyone in your life? {[facility] DENIES:870979::\"Denies\"}    Sexual Health     Sexual concerns: {YES / NO:986641::\"Yes\"}   STI History: {NEG/POS-NEG DEFAULT:380199::\"Neg\"}  Pregnancy History:   LMP Patient's last menstrual period was 03/10/2018. {[facility] LMP:361457}  Last Pap Smear Date: No results found for: PAP  Abnormal Pap History: {[facility] ABNORMAL PAP:034343}    Recommended Screening     {[facility] USPSTF LEVEL A:631450}  {[facility] USPSTF LEVEL B:231891}  {B-RST BRCA- Risk Tool}         Physical Exam:     Vitals: /85  Pulse 75  Temp 98.1  F (36.7  C) (Oral)  Wt 173 lb 12.8 oz (78.8 kg)  LMP 03/10/2018  SpO2 100%  BMI 31.79 kg/m2  BMI= Body mass index is 31.79 kg/(m^2).   {EXAM - FEMALE- zebra stripe:772898::\"GENERAL: healthy, alert and no distress\",\"EYES: Eyes grossly normal to inspection, extraocular movements - intact, and PERRL\",\"HENT: ear canals- normal; TMs- normal; Nose- normal; Mouth- no ulcers, no lesions\",\"NECK: no tenderness, no adenopathy, no asymmetry, no masses, no stiffness; thyroid- normal to " "palpation\",\"RESP: lungs clear to auscultation - no rales, no rhonchi, no wheezes\",\"BREAST: no masses, no tenderness, no nipple discharge, no palpable axillary masses or adenopathy\",\"CV: regular rates and rhythm, normal S1 S2, no S3 or S4 and no murmur, no click or rub -\",\"ABDOMEN: soft, no tenderness, no  hepatosplenomegaly, no masses, normal bowel sounds\",\"MS: extremities- no gross deformities noted, no edema\",\"SKIN: no suspicious lesions, no rashes\",\"NEURO: strength and tone- normal, sensory exam- grossly normal, mentation- intact, speech- normal, reflexes- symmetric\",\"BACK: no CVA tenderness, no paralumbar tenderness\",\"- female: cervix- normal, adnexae- normal; uterus- normal, no masses, no discharge\",\"RECTAL- female: no masses, no hemorrhoids\",\"PSYCH: Alert and oriented times 3; speech- coherent , normal rate and volume; able to articulate logical thoughts, able to abstract reason, no tangential thoughts, no hallucinations or delusions, affect- normal\",\"LYMPHATICS: ant. cervical- normal, post. cervical- normal, axillary- normal, supraclavicular- normal, inguinal- normal\"}      Assessment and Plan      Radha was seen today for physical and results.    Diagnoses and all orders for this visit:    Routine general medical examination at a health care facility  -     Pap imaged thin layer screen with HPV - recommended age 30 - 65 years (select HPV order below)      {[facility] FEMALE ASSESSMENT:952572::\"1. Health Care Maintenance: Normal Physical Exam\"}      1. {[facility] FEMALE PLAN 1:867494}  2.Contraception: {[facility] CONTRACEPTION:402853}    Options for treatment and follow-up care were reviewed with the patient . Radha Raamn and/or guardian engaged in the decision making process and verbalized understanding of the options discussed and agreed with the final plan.    Stevenson John, CMA  "

## 2018-03-24 ASSESSMENT — ENCOUNTER SYMPTOMS
ARTHRALGIAS: 0
COUGH: 0
SLEEP DISTURBANCE: 0
DIARRHEA: 0
DIFFICULTY URINATING: 0
SHORTNESS OF BREATH: 0
BLOOD IN STOOL: 0
PALPITATIONS: 0
POLYDIPSIA: 0
MYALGIAS: 0
FATIGUE: 0
VOMITING: 0
EYES NEGATIVE: 1
COLOR CHANGE: 0
FEVER: 0
ABDOMINAL PAIN: 0
SORE THROAT: 1
NUMBNESS: 0
CONSTIPATION: 0
ABDOMINAL DISTENTION: 0
CHEST TIGHTNESS: 0
VOICE CHANGE: 1
NERVOUS/ANXIOUS: 0
DYSURIA: 0
DYSPHORIC MOOD: 0

## 2018-03-24 NOTE — PROGRESS NOTES
HPI       Radha Camargo is a 42 year old  who presents for   Chief Complaint   Patient presents with     Physical     CPE     Results     From previous lab work     Menorrhagia  -Patient previously had Mirena for contraception this was removed when patient had tubal ligation after this she had a has had much more bleeding and this is resulted in some anemia.  She does report that she has generally regularly her regular but very heavy periods and that often has an extra.  In between when she had the Mirena she was regular and had very light periods.  She would like to consider having the Mirena back in.  Hoarseness  Radha reports that-she is often hoarse and has to clear her throat pretty frequently.  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  She has never been tested for allergies though she does report that when cottonwoods are in full-blown she has a lot of stuffy nose and discharge.  Needs cervical cytology  Patient reports a history of a LEEP for cervical dysplasia she reported that she had moderate dysplasia proximately 10 years ago she also reports that she had has had normal Pap smears since that time and last had a Pap smear about 3 years ago.  She denies any new sexual partners she is  and has had the same partner for the entire marriage which is over 10 years.  +++++++      Problem, Medication and Allergy Lists were reviewed and updated if needed..    Patient is an established patient of this clinic..         Review of Systems:   Review of Systems   Constitutional: Negative for fatigue and fever.   HENT: Positive for sore throat and voice change.    Eyes: Negative.  Negative for visual disturbance.   Respiratory: Negative for cough, chest tightness and shortness of breath.    Cardiovascular: Negative for chest pain and palpitations.   Gastrointestinal: Negative for abdominal distention,  abdominal pain, blood in stool, constipation, diarrhea and vomiting.   Endocrine: Negative for polydipsia and polyuria.   Genitourinary: Positive for menstrual problem (frequent and heavy). Negative for difficulty urinating and dysuria.   Musculoskeletal: Negative for arthralgias and myalgias.   Skin: Negative for color change and rash.   Neurological: Negative for numbness.   Psychiatric/Behavioral: Negative for dysphoric mood and sleep disturbance. The patient is not nervous/anxious.             Physical Exam:     Vitals:    03/23/18 1605   BP: 125/85   Pulse: 75   Temp: 98.1  F (36.7  C)   TempSrc: Oral   SpO2: 100%   Weight: 173 lb 12.8 oz (78.8 kg)     Body mass index is 31.79 kg/(m^2).  Vitals were reviewed and were normal     Physical Exam   Constitutional: She is oriented to person, place, and time. She appears well-developed. No distress.   HENT:   Head: Normocephalic.   Nose: Mucosal edema and rhinorrhea present. No sinus tenderness, nasal deformity or septal deviation. Right sinus exhibits no maxillary sinus tenderness and no frontal sinus tenderness. Left sinus exhibits no maxillary sinus tenderness and no frontal sinus tenderness.   Mouth/Throat: Uvula is midline and mucous membranes are normal. Normal dentition. No uvula swelling. No posterior oropharyngeal erythema or tonsillar abscesses. Oropharyngeal exudate: slight with cobblestoning appears as PND.   Eyes: Conjunctivae are normal. No scleral icterus.   Neck: Normal range of motion. No thyromegaly present.   Cardiovascular: Normal rate, regular rhythm, normal heart sounds and intact distal pulses.    No murmur heard.  Pulmonary/Chest: Effort normal and breath sounds normal. No respiratory distress. She has no wheezes.   Abdominal: Soft. Bowel sounds are normal. She exhibits no distension. There is no splenomegaly or hepatomegaly. There is no tenderness. Hernia confirmed negative in the right inguinal area and confirmed negative in the left inguinal  area.   Genitourinary: Vagina normal and uterus normal. Pelvic exam was performed with patient supine. There is no rash or tenderness on the right labia. There is no rash or tenderness on the left labia. Cervix exhibits no motion tenderness and no discharge. Right adnexum displays no mass, no tenderness and no fullness. Left adnexum displays no mass, no tenderness and no fullness.   Genitourinary Comments: Pap/HPV collected, patient declined GC/Chlam collection.   Musculoskeletal: She exhibits no edema.   Lymphadenopathy:     She has no cervical adenopathy.        Right: No inguinal adenopathy present.        Left: No inguinal adenopathy present.   Neurological: She is alert and oriented to person, place, and time.   Skin: Skin is warm and dry. She is not diaphoretic.   Psychiatric: She has a normal mood and affect. Her behavior is normal. Judgment and thought content normal.   Vitals reviewed.    Assessment and Plan          1. Routine general medical examination at a health care facility    - Pap imaged thin layer screen with HPV - recommended age 30 - 65 years (select HPV order below)    2. Chronic non-seasonal allergic rhinitis, unspecified trigger  If still sore and hoarse in 2 months then message me  - fluticasone (FLONASE) 50 MCG/ACT spray; Spray 1-2 sprays into both nostrils daily  Dispense: 16 g; Refill: 6  - loratadine (CLARITIN) 10 MG tablet; Take 1 tablet (10 mg) by mouth daily  Dispense: 90 tablet; Refill: 1    3. Menorrhagia with irregular cycle  Make an appointment for the Mirena discussed Mirena in detail with patient as she has had this before she is an excellent candidate for this so I asked her to go ahead and set up an appointment for Mirena.    - [facility] INTERNAL REFERRAL      Please call or return to clinic if your symptoms don't go away.    Follow up plan  For Vit D test and hgb in 6 weeks  Thank you for coming to [facility] today.       There are no  discontinued medications.    Options for treatment and follow-up care were reviewed with the patient. Radha Camargo  engaged in the decision making process and verbalized understanding of the options discussed and agreed with the final plan.    Rodrigo Durham MD

## 2018-03-25 ENCOUNTER — HEALTH MAINTENANCE LETTER (OUTPATIENT)
Age: 43
End: 2018-03-25

## 2018-03-27 ENCOUNTER — TELEPHONE (OUTPATIENT)
Dept: FAMILY MEDICINE | Facility: CLINIC | Age: 43
End: 2018-03-27

## 2018-03-27 LAB
COPATH REPORT: NORMAL
PAP: NORMAL

## 2018-03-27 NOTE — TELEPHONE ENCOUNTER
Called patient to schedule IUD insertion. No answer, left voicemail.    What procedure: Mirena placement  Urgency of Appointment: Next Available  Would this patient benefit from pre-medication with Ativan for procedural anxiety? No  Is this patient post-menopausal? No  Had mirena before and controlled the bleeding it was removed and menorrhagia returned.

## 2018-03-29 LAB
FINAL DIAGNOSIS: NORMAL
HPV HR 12 DNA CVX QL NAA+PROBE: NEGATIVE
HPV16 DNA SPEC QL NAA+PROBE: NEGATIVE
HPV18 DNA SPEC QL NAA+PROBE: NEGATIVE
SPECIMEN DESCRIPTION: NORMAL
SPECIMEN SOURCE CVX/VAG CYTO: NORMAL

## 2018-04-05 ENCOUNTER — MYC MEDICAL ADVICE (OUTPATIENT)
Dept: FAMILY MEDICINE | Facility: CLINIC | Age: 43
End: 2018-04-05

## 2018-04-17 ENCOUNTER — MYC MEDICAL ADVICE (OUTPATIENT)
Dept: FAMILY MEDICINE | Facility: CLINIC | Age: 43
End: 2018-04-17

## 2018-04-17 ENCOUNTER — OFFICE VISIT (OUTPATIENT)
Dept: FAMILY MEDICINE | Facility: CLINIC | Age: 43
End: 2018-04-17
Payer: COMMERCIAL

## 2018-04-17 VITALS
BODY MASS INDEX: 33.09 KG/M2 | WEIGHT: 179.8 LBS | TEMPERATURE: 97.8 F | DIASTOLIC BLOOD PRESSURE: 87 MMHG | SYSTOLIC BLOOD PRESSURE: 129 MMHG | HEIGHT: 62 IN | RESPIRATION RATE: 16 BRPM | OXYGEN SATURATION: 98 % | HEART RATE: 87 BPM

## 2018-04-17 DIAGNOSIS — R49.0 CHRONIC HOARSENESS: Primary | ICD-10-CM

## 2018-04-17 DIAGNOSIS — G89.29 CHRONIC THROAT PAIN: ICD-10-CM

## 2018-04-17 DIAGNOSIS — R07.0 CHRONIC THROAT PAIN: ICD-10-CM

## 2018-04-17 DIAGNOSIS — Z30.430 ENCOUNTER FOR INSERTION OF INTRAUTERINE CONTRACEPTIVE DEVICE: ICD-10-CM

## 2018-04-17 DIAGNOSIS — Z30.430 ENCOUNTER FOR IUD INSERTION: Primary | ICD-10-CM

## 2018-04-17 DIAGNOSIS — Z97.5 IUD (INTRAUTERINE DEVICE) IN PLACE: ICD-10-CM

## 2018-04-17 LAB — HCG UR QL: NEGATIVE

## 2018-04-17 NOTE — MR AVS SNAPSHOT
After Visit Summary   4/17/2018    Radha Camargo    MRN: [id number]           Patient Information     Date Of Birth          1975        Visit Information        Provider Department      4/17/2018 3:40 PM Diamante Peacock MD [facility]        Today's Diagnoses     Encounter for IUD insertion    -  1    Encounter for insertion of intrauterine contraceptive device          Care Instructions    IUD AFTERCARE INSTRUCTIONS     1. Uterine cramping is common after IUD placement. You can help relieve the discomfort with heating pads, Tylenol (acetaminophen), Aspirin or Advil (ibuprofen). If your cramping becomes very painful, please call the clinic.     2. Irregular bleeding and spotting is normal for the first few months after the IUD is placed. In some cases, women may experience irregular bleeding or spotting for up to six months after the IUD is placed. This bleeding can be annoying at first but usually will become lighter with the Mirena IUD quickly. Call the clinic if your bleeding is excessive and not getting better.     3. Your period will likely be shorter and lighter with a Mirena IUD. Approximately 40% of women will stop having periods altogether with the Mirena IUD. Your period may be heavier and longer with the Paragard IUD.     4. IUDs do not protect against sexually transmitted infections including the AIDS virus (HIV), warts (HPV), gonorrhea, Chlamydia, and herpes. Condoms should be used to decrease the risk sexually transmitted infections. If you think that you have been exposed to a sexually transmitted infection, please call the clinic.     5. If you had the IUD placed for birth control, the Paragard IUD is effective immediately. The Mirena IUD is effective immediately if it was inserted within seven days after the start of your period. If you have Mirena inserted at any other time during your menstrual cycle, use another method of birth control, like condoms for at  least 7 days.     6. It is possible for the IUD to come out of the uterus. If it does slip out of place, it is most likely to happen in the first few months after being put in. To make sure your IUD is in place, you can feel for the IUD strings between periods. To check for strings, wash your hands. Then, sit or squat down. Place one finger into your vagina until you feel your cervix. It will feel hard and rubbery, like the end of your nose. The string ends should be coming through your cervix. Do not pull on the strings. If the strings feel much longer than before, if you feel the hard plastic part of the IUD, or if you cannot feel the strings at all, the IUD may have moved out of place. Please call the clinic and consider using a back up form of birth control until you are seen.     7. Keep your follow-up appointment for 4-6 weeks after the IUD has been placed.     8. Pregnancy is unlikely after IUD placement, but can happen. If you have early pregnancy symptoms like nausea and vomiting, breast tenderness, frequent urination or abdominal pain, you can take a pregnancy test. Please call the clinic if you have any concerns or if your pregnancy test is positive.     9. The IUD should only be removed by a healthcare provider.     The Mirena IUD should be removed and/or replaced after 7 years.     The Paragard IUD should be removed and/or replaced after 12 years.     Warning Signs   Call the clinic if any of the following occurs:       Severe abdominal pain or cramping       Unusual bleeding       Fever or chills       Foul smelling vaginal discharge       Painful intercourse       Positive pregnancy test.                     Follow-ups after your visit        Who to contact     Please call your clinic at [phone] to:    Ask questions about your health    Make or cancel appointments    Discuss your medicines    Learn about your test results    Speak to your doctor            Additional Information About Your  "Visit        [employer] Information     [employer] gives you secure access to your electronic health record. If you see a primary care provider, you can also send messages to your care team and make appointments. If you have questions, please call your primary care clinic.  If you do not have a primary care provider, please call [phone] and they will assist you.      [employer] is an electronic gateway that provides easy, online access to your medical records. With [employer], you can request a clinic appointment, read your test results, renew a prescription or communicate with your care team.     To access your existing account, please contact your [facility] or call [phone] for assistance.        Care EveryWhere ID     This is your Care EveryWhere ID. This could be used by other organizations to access your [facility] medical records  [id number]        Your Vitals Were     Pulse Temperature Respirations Height Pulse Oximetry Breastfeeding?    87 97.8  F (36.6  C) (Oral) 16 5' 2\" (157.5 cm) 98% No    BMI (Body Mass Index)                   32.89 kg/m2            Blood Pressure from Last 3 Encounters:   04/17/18 129/87   03/23/18 125/85   03/12/18 128/83    Weight from Last 3 Encounters:   04/17/18 179 lb 12.8 oz (81.6 kg)   03/23/18 173 lb 12.8 oz (78.8 kg)   03/12/18 176 lb (79.8 kg)              We Performed the Following     HCG Qualitative Urine (UPT) (Radha's)        Primary Care Provider Office Phone # Fax #    Rodrigo Durham [phone]       [address] [address]        Equal Access to Services     [facility] EMMY : Hadii cici Kenny, waaxda luqadaha, qaybta yazanaltl hopper. So [facility] [phone].    ATENCIÓN: Si habla español, tiene a mueller disposición servicios gratuitos de asistencia lingüística. Llame al [phone].    We comply with applicable federal civil rights laws and " [place] laws. We do not discriminate on the basis of race, color, national origin, age, disability, sex, sexual orientation, or gender identity.            Thank you!     Thank you for choosing \[facility]\"" [facility]  for your care. Our goal is always to provide you with excellent care. Hearing back from our patients is one way we can continue to improve our services. Please take a few minutes to complete the written survey that you may receive in the mail after your visit with us. Thank you!             Your Updated Medication List - Protect others around you: Learn how to safely use, store and throw away your medicines at www.disposemymeds.org.          This list is accurate as of 4/17/18  4:12 PM.  Always use your most recent med list.                   Brand Name Dispense Instructions for use Diagnosis    cholecalciferol 27387 units capsule    VITAMIN D3    8 capsule    Take 1 capsule (50,000 Units) by mouth once a week    Vitamin D deficiency       ferrous sulfate 325 (65 Fe) MG tablet    IRON    30 tablet    Take 1 tablet (325 mg) by mouth daily (with breakfast)    Iron deficiency anemia, unspecified iron deficiency anemia type       fluticasone 50 MCG/ACT spray    FLONASE    16 g    Spray 1-2 sprays into both nostrils daily    Chronic non-seasonal allergic rhinitis, unspecified trigger       IBUPROFEN PO      Take 400 mg by mouth once        loratadine 10 MG tablet    CLARITIN    90 tablet    Take 1 tablet (10 mg) by mouth daily    Chronic non-seasonal allergic rhinitis, unspecified trigger

## 2018-04-17 NOTE — PATIENT INSTRUCTIONS
IUD AFTERCARE INSTRUCTIONS     1. Uterine cramping is common after IUD placement. You can help relieve the discomfort with heating pads, Tylenol (acetaminophen), Aspirin or Advil (ibuprofen). If your cramping becomes very painful, please call the clinic.     2. Irregular bleeding and spotting is normal for the first few months after the IUD is placed. In some cases, women may experience irregular bleeding or spotting for up to six months after the IUD is placed. This bleeding can be annoying at first but usually will become lighter with the Mirena IUD quickly. Call the clinic if your bleeding is excessive and not getting better.     3. Your period will likely be shorter and lighter with a Mirena IUD. Approximately 40% of women will stop having periods altogether with the Mirena IUD. Your period may be heavier and longer with the Paragard IUD.     4. IUDs do not protect against sexually transmitted infections including the AIDS virus (HIV), warts (HPV), gonorrhea, Chlamydia, and herpes. Condoms should be used to decrease the risk sexually transmitted infections. If you think that you have been exposed to a sexually transmitted infection, please call the clinic.     5. If you had the IUD placed for birth control, the Paragard IUD is effective immediately. The Mirena IUD is effective immediately if it was inserted within seven days after the start of your period. If you have Mirena inserted at any other time during your menstrual cycle, use another method of birth control, like condoms for at least 7 days.     6. It is possible for the IUD to come out of the uterus. If it does slip out of place, it is most likely to happen in the first few months after being put in. To make sure your IUD is in place, you can feel for the IUD strings between periods. To check for strings, wash your hands. Then, sit or squat down. Place one finger into your vagina until you feel your cervix. It will feel hard and rubbery, like the end of  your nose. The string ends should be coming through your cervix. Do not pull on the strings. If the strings feel much longer than before, if you feel the hard plastic part of the IUD, or if you cannot feel the strings at all, the IUD may have moved out of place. Please call the clinic and consider using a back up form of birth control until you are seen.     7. Keep your follow-up appointment for 4-6 weeks after the IUD has been placed.     8. Pregnancy is unlikely after IUD placement, but can happen. If you have early pregnancy symptoms like nausea and vomiting, breast tenderness, frequent urination or abdominal pain, you can take a pregnancy test. Please call the clinic if you have any concerns or if your pregnancy test is positive.     9. The IUD should only be removed by a healthcare provider.     The Mirena IUD should be removed and/or replaced after 7 years.     The Paragard IUD should be removed and/or replaced after 12 years.     Warning Signs   Call the clinic if any of the following occurs:       Severe abdominal pain or cramping       Unusual bleeding       Fever or chills       Foul smelling vaginal discharge       Painful intercourse       Positive pregnancy test.

## 2018-04-17 NOTE — PROGRESS NOTES
IUD Insertion Note    Radha Camargo is a patient of Rodrigo Escobedo here for IUD placement.   Indication: Menorrhagia    Counselling: Discussed potential side effects of Paragard, including increased bleeding and cramping, as well as the Mirena, including unpredictable spotting and amenorrhea.  Patient aware to check for strings every month.    Consent: Affirmation of informed consent was signed and scanned into the medical record. Risks, benefits and alternatives were discussed including small risk of uterine perforation during insertion. Patient's questions were elicited and answered.   Procedure safety checklist was completed:  Yes  Time Out (Pause for the Cause) completed: Yes    Labs: UPT negative    Patient chooses this IUD type: Mirena    Technique:   Patient was premedicated with ibuprofen:   No  Uterine position was confirmed by bimanual exam: Yes   Using a sterile speculum and equipment, the cervix  was examined.   A GC-Chlamydia probe was collected:   No   The cervix was cleansed with Betadine prep. Tenaculum was applied to cervix with tension to straighten cervical canal. The uterus was sounded to 9 cm. The Mirena insertion apparatus was prepared and introduced into the cervix without significant resistance.  The IUD was placed in the uterus. The strings were trimmed 3 cm below the cervix.   IUD Lot #: [id number]  EBL: minimal  Complications: No  Tolerance: Pt tolerated procedure well and was in stable condition.     Follow up: Pt was instructed to call if heavy bleeding, severe cramping or foul smelling discharge. May take 400-600 mg ibuprofen TID prn for mild cramping. Pt should return in one month for IUD string check. Pt instructed she requires a new IUD device in 7 years.     Resident: Melissa Gallardo  Faculty: Diamante Peacock MD present for and supervised this entire procedure.

## 2018-04-17 NOTE — PROGRESS NOTES
Preceptor Attestation:   Patient seen, evaluated and discussed with the resident. I was present for and supervised the entire procedure. I have verified the content of the note, which accurately reflects my assessment of the patient and the plan of care.   Supervising Physician:  Diamante Peacock MD

## 2018-04-30 ENCOUNTER — OFFICE VISIT (OUTPATIENT)
Dept: FAMILY MEDICINE | Facility: CLINIC | Age: 43
End: 2018-04-30
Payer: COMMERCIAL

## 2018-04-30 VITALS
HEART RATE: 72 BPM | TEMPERATURE: 98.1 F | BODY MASS INDEX: 32.15 KG/M2 | SYSTOLIC BLOOD PRESSURE: 118 MMHG | DIASTOLIC BLOOD PRESSURE: 79 MMHG | WEIGHT: 175.8 LBS | OXYGEN SATURATION: 99 %

## 2018-04-30 DIAGNOSIS — E55.9 VITAMIN D DEFICIENCY: ICD-10-CM

## 2018-04-30 DIAGNOSIS — D50.8 IRON DEFICIENCY ANEMIA SECONDARY TO INADEQUATE DIETARY IRON INTAKE: Primary | ICD-10-CM

## 2018-04-30 LAB — HEMOGLOBIN: 12 G/DL (ref 11.7–15.7)

## 2018-04-30 ASSESSMENT — ENCOUNTER SYMPTOMS
CHILLS: 0
CONSTIPATION: 1
FEVER: 0
DYSURIA: 0
SHORTNESS OF BREATH: 0
HEADACHES: 0
DIFFICULTY URINATING: 0
ABDOMINAL PAIN: 0
COUGH: 0
DIZZINESS: 0

## 2018-04-30 NOTE — PROGRESS NOTES
Preceptor Attestation:   Patient seen, evaluated and discussed with the resident. I have verified the content of the note, which accurately reflects my assessment of the patient and the plan of care.   Supervising Physician:  Nneka Chilel MD

## 2018-04-30 NOTE — LETTER
May 3, 2018      Radha Camargo  [address] [address]        Dear Radha,    Thank you for getting your care at [facility]. Please see below for your test results. Your vitamin D and iron are improving.     Resulted Orders   Vitamin D Level   Result Value Ref Range    Vitamin D Deficiency screening 55 20 - 75 ug/L      Comment:      Season, race, dietary intake, and treatment affect the concentration of   25-hydroxy-Vitamin D. Values may decrease during winter months and increase   during summer months. Values 20-29 ug/L may indicate Vitamin D insufficiency   and values <20 ug/L may indicate Vitamin D deficiency.  Vitamin D determination is routinely performed by an immunoassay specific for   25 hydroxyvitamin D3.  If an individual is on vitamin D2 (ergocalciferol)   supplementation, please specify 25 OH vitamin D2 and D3 level determination by   LCMSMS test [id number].     Hemoglobin (HGB) ([facility])   Result Value Ref Range    Hemoglobin 12.0 11.7 - 15.7 g/dL   Ferritin   Result Value Ref Range    Ferritin 15 12 - 150 ng/mL   Iron and iron binding capacity   Result Value Ref Range    Iron 35 35 - 180 ug/dL    Iron Binding Cap 314 240 - 430 ug/dL    Iron Saturation Index 11 (L) 15 - 46 %       If you have any concerns about these results please call and leave a message for me or send a [employer] message to the clinic.    Sincerely,    Diamante Adler MD

## 2018-04-30 NOTE — PATIENT INSTRUCTIONS
Here is the plan from today's visit    1. Iron deficiency anemia secondary to inadequate dietary iron intake  - Hemoglobin (HGB) ([facility])  - Ferritin  - Iron and iron binding capacity    2. Vitamin D deficiency  - Vitamin D Level    Please call or return to clinic if your symptoms don't go away.    Follow up plan  Please make a clinic appointment for follow up with your primary physician Rodrigo Durham MD if concerns.    Thank you for coming to [facility] today.  Lab Testing:  **If you had lab testing today and your results are reassuring or normal they will be mailed to you or sent through [employer] within 7 days.   **If the lab tests need quick action we will call you with the results.  The phone number we will call with results is # [phone] (home) . If this is not the best number please call our clinic and change the number.  Medication Refills:  If you need any refills please call your pharmacy and they will contact us.   If you need to  your refill at a new pharmacy, please contact the new pharmacy directly. The new pharmacy will help you get your medications transferred faster.   Scheduling:  If you have any concerns about today's visit or wish to schedule another appointment please call our office during normal business hours [phone] (8-5:00 M-F)  If a referral was made to a [facility] and you don't get a call from central scheduling please call [phone].  If a Mammogram was ordered for you at [facility] call [phone] to schedule or change your appointment.  If you had an XRay/CT/Ultrasound/MRI ordered the number is [phone] to schedule or change your radiology appointment.   Medical Concerns:  If you have urgent medical concerns please call [phone] at any time of the day.    Diamante Adler MD

## 2018-04-30 NOTE — PROGRESS NOTES
HPI:       Radha Camargo is a 42 year old who presents for the following  Patient presents with:  RECHECK: Vitamin D and Iron    She presents for recheck of iron and vitamin D as instructed by her PCP, Dr. Durham. She has been taking vitamin D 50,000IU weekly for past 7 weeks and has one dose remaining until she is finished.She has noticed energy level seems better while she has been taking vitamin D. Has also been taking ferrous sulfate 325mg daily. Has noticed some constipation with iron, however she reports improvement with increasing water intake and has not needed to take any stool softeners. She has had a sore throat for 8 months and has an appointment with ENT June 21st 2018. No other current concerns.     Problem, Medication and Allergy Lists were reviewed and are current.  Patient is   an established patient of this clinic., History reviewed. No pertinent past medical history.,   Family History     Problem (# of Occurrences) Relation (Name,Age of Onset)    Anxiety Disorder (1) Mother    Breast Cancer (1) Mother (61)    Colon Cancer (1) Brother    Coronary Artery Disease (1) Paternal Grandmother    DIABETES (1) Paternal Grandfather    Depression (1) Mother    Hypertension (1) Mother    Obesity (3) Mother, Maternal Grandmother, Cousin    Other Cancer (2) Mother (30): ovarian, Sister    Thyroid Disease (2) Maternal Grandmother, Cousin    Unknown/Adopted (1) Paternal Grandfather       and   Social History     Social History     Marital status:      Spouse name: N/A     Number of children: N/A     Years of education: N/A     Social History Main Topics     Smoking status: Never Smoker     Smokeless tobacco: Never Used     Alcohol use No     Drug use: No     Sexual activity: Yes            Review of Systems:   Review of Systems   Constitutional: Negative for chills and fever.   Respiratory: Negative for cough and shortness of breath.    Cardiovascular: Negative for chest pain.   Gastrointestinal:  Positive for constipation. Negative for abdominal pain.   Genitourinary: Negative for difficulty urinating and dysuria.   Neurological: Negative for dizziness and headaches.             Physical Exam:     Patient Vitals for the past 24 hrs:   BP Temp Temp src Pulse SpO2 Weight   04/30/18 1550 118/79 98.1  F (36.7  C) Oral 72 99 % 175 lb 12.8 oz (79.7 kg)     Body mass index is 32.15 kg/(m^2).  Vitals were reviewed and were normal     Physical Exam   Constitutional: She appears well-developed and well-nourished. No distress.   HENT:   Head: Normocephalic and atraumatic.   Eyes: Conjunctivae are normal. No scleral icterus.   Cardiovascular: Normal rate, regular rhythm and normal heart sounds.    No murmur heard.  Pulmonary/Chest: Effort normal and breath sounds normal. She has no wheezes.   Abdominal: Soft. Bowel sounds are normal. She exhibits no distension. There is no tenderness.   Skin: Skin is warm and dry. No rash noted.   Psychiatric: She has a normal mood and affect. Her behavior is normal.         Results:     Results for orders placed or performed in visit on 04/30/18   Vitamin D Level   Result Value Ref Range    Vitamin D Deficiency screening 55 20 - 75 ug/L   Hemoglobin (HGB) ([facility])   Result Value Ref Range    Hemoglobin 12.0 11.7 - 15.7 g/dL   Ferritin   Result Value Ref Range    Ferritin 15 12 - 150 ng/mL   Iron and iron binding capacity   Result Value Ref Range    Iron 35 35 - 180 ug/dL    Iron Binding Cap 314 240 - 430 ug/dL    Iron Saturation Index 11 (L) 15 - 46 %       Assessment and Plan     Radha was seen today for recheck.    Diagnoses and all orders for this visit:    Iron deficiency anemia secondary to inadequate dietary iron intake  Improved with hemoglobin 12.0 and ferritin 15. Continue taking ferrous sulfate 325mg daily.   -     Hemoglobin (HGB) ([facility])  -     Ferritin  -     Iron and iron binding capacity    Vitamin D deficiency  Improved with vitamin D level of 55. Recommended that  the patient continue to take 800IU of vitamin D daily after finishing current high dose vitamin D.   -     Vitamin D Level    There are no discontinued medications.  Options for treatment and follow-up care were reviewed with the patient. Radha Camargo  engaged in the decision making process and verbalized understanding of the options discussed and agreed with the final plan.    Diamante Adler MD

## 2018-04-30 NOTE — MR AVS SNAPSHOT
After Visit Summary   4/30/2018    Radha Camargo    MRN: [id number]           Patient Information     Date Of Birth          1975        Visit Information        Provider Department      4/30/2018 4:00 PM Diamante Adler MD [facility]        Today's Diagnoses     Iron deficiency anemia secondary to inadequate dietary iron intake    -  1    Vitamin D deficiency          Care Instructions    Here is the plan from today's visit    1. Iron deficiency anemia secondary to inadequate dietary iron intake  - Hemoglobin (HGB) ([facility])  - Ferritin  - Iron and iron binding capacity    2. Vitamin D deficiency  - Vitamin D Level    Please call or return to clinic if your symptoms don't go away.    Follow up plan  Please make a clinic appointment for follow up with your primary physician Rodrigo Durham MD if concerns.    Thank you for coming to [facility] today.  Lab Testing:  **If you had lab testing today and your results are reassuring or normal they will be mailed to you or sent through [employer] within 7 days.   **If the lab tests need quick action we will call you with the results.  The phone number we will call with results is # [phone] (home) . If this is not the best number please call our clinic and change the number.  Medication Refills:  If you need any refills please call your pharmacy and they will contact us.   If you need to  your refill at a new pharmacy, please contact the new pharmacy directly. The new pharmacy will help you get your medications transferred faster.   Scheduling:  If you have any concerns about today's visit or wish to schedule another appointment please call our office during normal business hours [phone] (8-5:00 M-F)  If a referral was made to a [facility] and you don't get a call from central scheduling please call [phone].  If a Mammogram was ordered for you at [facility] call [phone] to  schedule or change your appointment.  If you had an XRay/CT/Ultrasound/MRI ordered the number is [phone] to schedule or change your radiology appointment.   Medical Concerns:  If you have urgent medical concerns please call [phone] at any time of the day.    Diamante Adler MD            Follow-ups after your visit        Your next 10 appointments already scheduled     Jun 21, 2018  3:00 PM CDT   (Arrive by 2:45 PM)   New Patient Visit with Ameya Nam MD   [facility] ([facility] [place])    [address] [place] [address]  4th [facility] [address] [phone]           This is a multi-disciplinary care team visit as patients with your type of problem are usually seen by a team of an MD and a Speech-Language Pathologist (who is a specialist in disorders of the voice, throat, and breathing).  Please plan about 2 hours for your visit, which will likely include Laryngeal Function Studies, a Voice/Swallow/Breathing Evaluation, and an Endoscopic Laryngeal Examination to provide a comprehensive evaluation.  Please check with your insurance company to ensure you are covered for these services. - It is important to know that if you are evaluated and/or treated by both a physician and a speech pathologist during your visit, your billing will reflect the input that you receive from both providers as separate professionals. Although most insurance plans do cover these services, we encourage you to contact your insurance company prior to your visit to determine whether there are any coverage limitations that might affect you financially. - Billing/procedure codes that are frequently associated with visits to our clinic include (but are not limited to) the ones listed below. Most patients will not need all of these items, but it may be useful to ask your insurance company's patient . 04040: Flexible fiberoptic laryngoscopy, 39788:  Laryngoscopy; flexible or rigid fiberoptic, with stroboscopy, 83579: Flexible endoscopic evaluation of swallowing, 68883: Laryngeal function aerodynamic evaluation, 77028: Evaluation of Voice and Resonance, 22458: Speech pathology treatment for voice, speech, communication, 63789: Speech pathology treatment for swallowing/oral function for feeding - If you have any concerns or questions, or if you would prefer not to have a speech pathologist involved in your visit, please contact our Clinic Coordinator at [phone], at least 24 hours prior to your appointment.            Jun 21, 2018  3:00 PM CDT   (Arrive by 2:45 PM)   [facility] Speech Pathology and ENT Evaluation with  Ent Dysphonia Slp Provider   [facility] ([facility])    [address] [place] [address] [phone]              Who to contact     Please call your clinic at [phone] to:    Ask questions about your health    Make or cancel appointments    Discuss your medicines    Learn about your test results    Speak to your doctor            Additional Information About Your Visit        [employer] Information     [employer] gives you secure access to your electronic health record. If you see a primary care provider, you can also send messages to your care team and make appointments. If you have questions, please call your primary care clinic.  If you do not have a primary care provider, please call [phone] and they will assist you.      [employer] is an electronic gateway that provides easy, online access to your medical records. With [employer], you can request a clinic appointment, read your test results, renew a prescription or communicate with your care team.     To access your existing account, please contact your [facility] or call [phone] for assistance.        Care EveryWhere ID     This is your Care EveryWhere ID. This could be used by other organizations to  access your [place] medical records  [id number]        Your Vitals Were     Pulse Temperature Last Period Pulse Oximetry BMI (Body Mass Index)       72 98.1  F (36.7  C) (Oral) 04/29/2018 99% 32.15 kg/m2        Blood Pressure from Last 3 Encounters:   04/30/18 118/79   04/17/18 129/87   03/23/18 125/85    Weight from Last 3 Encounters:   04/30/18 175 lb 12.8 oz (79.7 kg)   04/17/18 179 lb 12.8 oz (81.6 kg)   03/23/18 173 lb 12.8 oz (78.8 kg)              We Performed the Following     Ferritin     Hemoglobin (HGB) ([facility])     Iron and iron binding capacity     Vitamin D Level        Primary Care Provider Office Phone # Fax #    Rodrigo Durham [phone]       [address] [address]        Equal Access to Services     SALINA [facility] : Hadii cici weisso Soodilia, waaxda luqadaha, qaybta kaalmada adeakosuayaraoul, tl pascual . So [facility] [phone].    ATENCIÓN: Si habla español, tiene a mueller disposición servicios gratuitos de asistencia lingüística. Rod al [phone].    We comply with applicable federal civil rights laws and [place] laws. We do not discriminate on the basis of race, color, national origin, age, disability, sex, sexual orientation, or gender identity.            Thank you!     Thank you for choosing [facility]  for your care. Our goal is always to provide you with excellent care. Hearing back from our patients is one way we can continue to improve our services. Please take a few minutes to complete the written survey that you may receive in the mail after your visit with us. Thank you!             Your Updated Medication List - Protect others around you: Learn how to safely use, store and throw away your medicines at www.disposemymeds.org.          This list is accurate as of 4/30/18  4:22 PM.  Always use your most recent med list.                   Brand Name Dispense Instructions for use Diagnosis     cholecalciferol 32611 units capsule    VITAMIN D3    8 capsule    Take 1 capsule (50,000 Units) by mouth once a week    Vitamin D deficiency       ferrous sulfate 325 (65 Fe) MG tablet    IRON    30 tablet    Take 1 tablet (325 mg) by mouth daily (with breakfast)    Iron deficiency anemia, unspecified iron deficiency anemia type       fluticasone 50 MCG/ACT spray    FLONASE    16 g    Spray 1-2 sprays into both nostrils daily    Chronic non-seasonal allergic rhinitis, unspecified trigger       IBUPROFEN PO      Take 400 mg by mouth once        levonorgestrel 20 MCG/24HR IUD    MIRENA     1 each (20 mcg) by Intrauterine route continuous    Encounter for IUD insertion       loratadine 10 MG tablet    CLARITIN    90 tablet    Take 1 tablet (10 mg) by mouth daily    Chronic non-seasonal allergic rhinitis, unspecified trigger

## 2018-05-01 LAB
DEPRECATED CALCIDIOL+CALCIFEROL SERPL-MC: 55 UG/L (ref 20–75)
FERRITIN SERPL-MCNC: 15 NG/ML (ref 12–150)
IRON SATN MFR SERPL: 11 % (ref 15–46)
IRON SERPL-MCNC: 35 UG/DL (ref 35–180)
TIBC SERPL-MCNC: 314 UG/DL (ref 240–430)

## 2018-06-08 NOTE — TELEPHONE ENCOUNTER
FUTURE VISIT INFORMATION      FUTURE VISIT INFORMATION:    Date:06/21/2018    Time: 3:00    [place] [facility] [place]FORMATION:    Referring provider:  Rodrigo Durham    Ref[facility] providers clinic[facility]    Reason for visit/diagnosis  Chronic hoarseness, Chronic throat pain    RECORDS REQUESTED FROM:       Clinic name Comments Records Status Imaging Status   [facility] MEDICAL ADVICE 04/17/2018 AND 04/06/2018 INTERNAL NONE                                   RECORDS STATUS

## 2018-06-21 ENCOUNTER — OFFICE VISIT (OUTPATIENT)
Dept: OTOLARYNGOLOGY | Facility: CLINIC | Age: 43
End: 2018-06-21
Payer: COMMERCIAL

## 2018-06-21 ENCOUNTER — PRE VISIT (OUTPATIENT)
Dept: OTOLARYNGOLOGY | Facility: CLINIC | Age: 43
End: 2018-06-21

## 2018-06-21 VITALS
DIASTOLIC BLOOD PRESSURE: 88 MMHG | HEART RATE: 114 BPM | WEIGHT: 173 LBS | BODY MASS INDEX: 30.65 KG/M2 | HEIGHT: 63 IN | SYSTOLIC BLOOD PRESSURE: 127 MMHG

## 2018-06-21 DIAGNOSIS — R49.0 DYSPHONIA: Primary | ICD-10-CM

## 2018-06-21 DIAGNOSIS — J38.7 LARYNGEAL HYPERFUNCTION: ICD-10-CM

## 2018-06-21 ASSESSMENT — PAIN SCALES - GENERAL: PAINLEVEL: SEVERE PAIN (7)

## 2018-06-21 NOTE — MR AVS SNAPSHOT
After Visit Summary   6/21/2018    Radha Camargo    MRN: [id number]           Patient Information     Date Of Birth          1975        Visit Information        Provider Department      6/21/2018 3:00 PM Jeanne Abreu, SLP [facility]        Today's Diagnoses     Dysphonia    -  1       Follow-ups after your visit        Who to contact     Please call your clinic at [phone] to:    Ask questions about your health    Make or cancel appointments    Discuss your medicines    Learn about your test results    Speak to your doctor            Additional Information About Your Visit        MyChart Information     [employer] gives you secure access to your electronic health record. If you see a primary care provider, you can also send messages to your care team and make appointments. If you have questions, please call your primary care clinic.  If you do not have a primary care provider, please call [phone] and they will assist you.      [employer] is an electronic gateway that provides easy, online access to your medical records. With [employer], you can request a clinic appointment, read your test results, renew a prescription or communicate with your care team.     To access your existing account, please contact your [facility] or call [phone] for assistance.        Care EveryWhere ID     This is your Care EveryWhere ID. This could be used by other organizations to access your [place] medical records  [id number]         Blood Pressure from Last 3 Encounters:   06/21/18 127/88   04/30/18 118/79   04/17/18 129/87    Weight from Last 3 Encounters:   06/21/18 78.5 kg (173 lb)   04/30/18 79.7 kg (175 lb 12.8 oz)   04/17/18 81.6 kg (179 lb 12.8 oz)              We Performed the Following     C BEHAVIORAL & QUALITATIVE ANALYSIS VOICE AND RESONANCE        Primary Care Provider Office Phone # Fax #    Rodrigo Durham [phone]       [address]   [address] [address]        Equal Access to Services     SAV BOOTH : Hadii aad ku hadelizabethmunir Kenny, wawaleda gretta, qajeana moreland, tl decker. So [facility] [phone].    ATENCIÓN: Si habla español, tiene a mueller disposición servicios gratuitos de asistencia lingüística. Llame al [phone].    We comply with applicable federal civil rights laws and [place] laws. We do not discriminate on the basis of race, color, national origin, age, disability, sex, sexual orientation, or gender identity.            Thank you!     Thank you for choosing [facility]  for your care. Our goal is always to provide you with excellent care. Hearing back from our patients is one way we can continue to improve our services. Please take a few minutes to complete the written survey that you may receive in the mail after your visit with us. Thank you!             Your Updated Medication List - Protect others around you: Learn how to safely use, store and throw away your medicines at www.disposemymeds.org.          This list is accurate as of 6/21/18 11:59 PM.  Always use your most recent med list.                   Brand Name Dispense Instructions for use Diagnosis    cholecalciferol 55281 units capsule    VITAMIN D3    8 capsule    Take 1 capsule (50,000 Units) by mouth once a week    Vitamin D deficiency       ferrous sulfate 325 (65 Fe) MG tablet    IRON    30 tablet    Take 1 tablet (325 mg) by mouth daily (with breakfast)    Iron deficiency anemia, unspecified iron deficiency anemia type       fluticasone 50 MCG/ACT spray    FLONASE    16 g    Spray 1-2 sprays into both nostrils daily    Chronic non-seasonal allergic rhinitis, unspecified trigger       IBUPROFEN PO      Take 400 mg by mouth once        levonorgestrel 20 MCG/24HR IUD    MIRENA     1 each (20 mcg) by Intrauterine route continuous    Encounter for IUD insertion       loratadine 10 MG tablet    CLARITIN    90  tablet    Take 1 tablet (10 mg) by mouth daily    Chronic non-seasonal allergic rhinitis, unspecified trigger

## 2018-06-21 NOTE — PROGRESS NOTES
HISTORY OF PRESENT ILLNESS: Radha Camargo is a 42 year old female with a history of hoarseness and intermittent anterior neck pain over the last 10-11 months.  She has tried reflux medications as well as anti-histamines.  All of these have not been effective.  She notes she quit smoking 2-3 years ago but did not have a voice problem while smoking or early after sensation of using cigarettes.  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  She does complain of increased effort with talking irritation and ache in the throat and dryness of the throat.  She will have voice breaks changes in volume changes in range.  She will complain of a gravelly voice and a scratchy voice.  She does drink 16 ounces of caffeine in a day.  She notes that her voice problems will make it difficult for people to hear her and will have difficulty in a noisy room.  She has no airway difficulties and no swallowing difficulties.  She is healthy in general.    Last 2 Scores for Patient-Answered VHI Questionnaire  VHI Total Score 6/14/2018   VHI Total Score 21       Last 2 Scores for Patient-Answered CSI Questionnaire  CSI Total Score 6/14/2018   CSI Total Score 0         Last 2 Scores for Patient-Answered EAT Questionnaire  EAT Total Score 6/14/2018   EAT Total Score 11           PAST MEDICAL HISTORY: Menorrhagia, weight gain and hoarseness    PAST SURGICAL HISTORY: No prior head and neck surgery    FAMILY HISTORY:   Family History   Problem Relation Age of Onset     Breast Cancer Mother 61     Other Cancer Mother 30     ovarian     Hypertension Mother      Anxiety Disorder Mother      Depression Mother      Obesity Mother      Thyroid Disease Maternal Grandmother      Obesity Maternal Grandmother      Coronary Artery Disease Paternal Grandmother      Diabetes Paternal Grandfather      Unknown/Adopted Paternal Grandfather      Colon Cancer Brother      Other Cancer Sister       Thyroid Disease Cousin      Obesity Cousin        SOCIAL HISTORY:   Social History   Substance Use Topics     Smoking status: Never Smoker     Smokeless tobacco: Never Used     Alcohol use No       REVIEW OF SYSTEMS: Ten point review of systems was performed and is negative except for:   [facility] ROS 6/14/2018   Constitutional Weight gain, Unexplained fatigue   Neurology Headache   Ears, Nose, Throat Sore throat, Trouble swallowing, Hoarseness   Musculoskeletal Sore or stiff joints, Swollen joints, Swollen legs/feet        ALLERGIES: Seasonal allergies    MEDICATIONS:   Current Outpatient Prescriptions   Medication Sig Dispense Refill     cholecalciferol (VITAMIN D3) 02768 UNITS capsule Take 1 capsule (50,000 Units) by mouth once a week 8 capsule 0     ferrous sulfate (IRON) 325 (65 FE) MG tablet Take 1 tablet (325 mg) by mouth daily (with breakfast) 30 tablet 2     fluticasone (FLONASE) 50 MCG/ACT spray Spray 1-2 sprays into both nostrils daily 16 g 6     IBUPROFEN PO Take 400 mg by mouth once       levonorgestrel (MIRENA) 20 MCG/24HR IUD 1 each (20 mcg) by Intrauterine route continuous       loratadine (CLARITIN) 10 MG tablet Take 1 tablet (10 mg) by mouth daily (Patient not taking: Reported on 6/21/2018) 90 tablet 1         PHYSICAL EXAMINATION:  She  is awake, alert and in no apparent distress.    Her tympanic membranes are clear and intact bilaterally. External auditory canals are clear.  Nasal exam shows a mild septal deviation without obstruction.  Examination of the oral cavity shows no suspicious lesions.  There is symmetric movement of the tongue and soft palate.    The oropharynx is clear.  Her neck is supple without significant adenopathy.  There is tenderness to palpation of the thyrohyoid muscle.  Pulse is regular.  Upper airway is clear.  Cranial nerves II-XII are grossly intact.       PROCEDURE: A flexible laryngoscopy  was performed.  Informed consent was obtained and a time out was performed. 3%  lidocaine and 0.25% phenylephrine was sprayed into the nasal cavity and allowed 3 to 5 minutes for effect. The scope was passed through the right sided nostril. Examination showed the vocal folds to be mobile and meet in the midline.  No nodules, polyps or ulcerations are seen.  There is mild inflammation or erythema of the supraglottic or glottic larynx.  Additional irritation of the midportion of the vocal folds is seen today without significant mass effect.  With phonation there is moderate to severe contraction of the supraglottic larynx.  The hypopharynx is otherwise clear as is the subglottis.     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  This was reviewed with her both with video and her exam and on her examination.  I am recommending a trial of speech therapy for this.  She did meet with our speech-language pathologist to go over therapy.  Given her mild inflammation of her midportion of the vocal fold I have asked her to follow-up with me after therapy to check for resolution of findings.

## 2018-06-21 NOTE — PROGRESS NOTES
[facility] VOICE CLINIC  Ameya Nam Jr., M.D., F.A.C.S.  Angelica Chaudhary M.D., M.P.H.  Jeanne Abreu, Ph.D., CCC/SLP  Keri Espino M.M. (voice), MKATIE., CCC/SLP  Robert Bustamante M.M. (voice), M.A., [facility]/SLP    [facility] VOICE [facility]  VOICE/SPEECH/BREATHING EVALUATION AND LARYNGEAL EXAMINATION REPORT    Patient: Radha Camargo  Date of Visit: 6/21/2018    CHIEF COMPLAINT:  voice    HISTORY  Radha Camargo was seen for initial voice evaluation and laryngeal examination in conjunction with a visit to Dr. Nam.  Please refer to Dr. Nam s dictation for a more complete history and impressions. Salient details of her history are as follows:    Ms. Camargo is a 42 year old with a history of dysphonia.    Symptoms began 10-11 months ago with no obvious precipitating event    Had quit smoking 2-3 years ago, with no voice changes before or after    No improvement with reflux treatment or antibiotics    Most bothered by sore throat, often upon awakening    Unremarkable voice use    DIAGNOSIS/REASON FOR REFERRAL  Dysphonia/ Evaluate, perform laryngeal exam, treat as appropriate    Patient Supplied Answers To [facility] Intake General Questionnaire  [facility] Intake - General Form Review 6/14/2018   Are you having any of these symptoms? Total loss of voice, Increased effort to talk/sing, Throat irritation, Throat tightness, Pain/ache in throat, Dry throat, Lump in throat, Poor voice quality   Are you having any of these symptoms? Voice tires easily, Voice breaks, Change in vocal volume, Change in range, Gravelly voice, Raspy voice, Scratchy voice   Do you use caffeine? Yes   If you do use caffeine, how many oz. do you typically drink in a day? 16   How many oz. of non-caffeinated fluid do you typically drink in a day? 32   How often do you experience heartburn, indigestion, chest pain, stomach acid coming up, and/or tasting acid in your mouth or throat? Rarely   Have reflux medications been recommended to you? Yes   If yes, are you taking  "them regularly? No   Other physicians/health care providers who should receive a copy of today's note (please provide first and last name(s), city): Dr Rodrigo Durham        Patient Supplied Answers To [facility] Intake Voice Questionnaire  No flowsheet data found.    Patient Supplied Answers To VHI Questionnaire  Voice Handicap Index (VHI-10) 6/14/2018   My voice makes it difficult for people to hear me 3   People have difficulty understanding me in a noisy room 3   My voice difficulties restrict my personal and social life.  2   I feel left out of conversations because of my voice 2   My voice problem causes me to lose income 0   I feel as though I have to strain to produce voice 3   The clarity of my voice is unpredictable 3   My voice problem upsets me 3   My voice makes me feel handicapped 1   People ask, \"What's wrong with your voice?\" 4   VHI-10 24       Patient Supplied Answers To [facility] Intake Throat Discomfort Questionnaire  No flowsheet data found.    CURRENT PATIENT COMPLAINTS    Primary: voice, sore throat    Denies significant dyspnea or dysphagia    OTHER PERTINENT HISTORY    Unknown, please see Dr. Nam's dictation    OBJECTIVE FINDINGS  VOICE/ SPEECH/ NON-COMMUNICATIVE LARYNGEAL BEHAVIORS EVALUATION  An evaluation of the voice was accomplished today; salient features are as follows:    Palpation of the laryngeal area shows:    Tenderness of the thyrohyoid area, base of larynx, and thyroid alae     Breathing pattern:    Inspirations can be within normal limits and adequate, are sometimes inadequate for speech in volume and frequency    High clavicular elevation on instruction to take a deep breath    Tension is evident:    Jaw; obvious clenching    Voice quality is characterized by    WNL quality in conversational speech    Mild backward focus    Habitual pitch is on the low end of normal, at around Eb3    Pitch range in conversational speech is adequate, but very reduced in a reading task    Loudness is " WNL and appropriate for the setting    Sustained vowels are weak, with poor airflow    A singing task shows voice quality is consistent between speech and singing, with somewhat better relaxation, in the range from D3 to D4    she states today is a typical voice day, but that her voice can often be much worse    GLOBAL ASSESSMENT OF DYSPHONIA: 33 /100    CAPE-V Overall Severity:   14/100    AERODYNAMIC AND ACOUSTIC EVALUATION  Aerodynamic measures could not be completed today; all laryngeal function measures will be reported when aerodynamic measures are obtained.    LARYNGEAL EXAMINATION    Endoscopic laryngeal examination was performed by:  Ameya Nam MD  I provided technical support, and provided the protocol of instructions for the patient.  Type of exam:   Flexible endoscopy with chip-tip technology     The laryngeal and pharyngeal structures were evaluated for gross appearance, mobility, function, and focal lesions / abnormalities of the associated mucosa.   All findings were within normal limits with the exception of the following salient features:     Moderate collection of thickened secretions, on the vocal folds and throughout the laryngeal area    Vocal fold mucosa is mildly dry, and there is mild erythema at the midfolds bilaterally    Slight resistance or weakness in full abduction during a task of rapid vowel/sniff repetitions    Elongation of vocal folds for pitch increase is limited    Severe constriction in the anterior-posterior dimension of the suplraglottic larynx during phonatory tasks    Vocal folds cannot be seen during connected speech    Epiglottis touches posterior pharyngeal wall    There may be medio-lateral constriction, but this cannot be seen during speech    Stroboscopy was not warranted    Dr. Nam and I reviewed this laryngeal exam with Ms. Camargo today, and I provided pertinent explanations, as well as written information:    Endoscopic findings are consistent with  audio-perceptual assessment and patient Hx/complaints.    her symptoms are accounted for by severe muscular imbalance, especially supraglottic hyperfunction     Because there appears to be a functional component to her symptoms, she would most likely benefit from functional speech therapy.    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    She demonstrates a Good prognosis for improvement given adherence to therapeutic recommendations. Therapeutic     Positive indicators: commitment to process    Negative indicators: none    TREATMENT PLAN  Speech therapy    DURATION/FREQUENCY OF TREATMENT  Six weekly or bi-weekly, one-hour sessions, with two monthly one-hour follow-up sessions, as well as follow-up with Dr. Nam    GOALS  Patient goal:    To have a normal and acceptable voice quality and comfort for all her voice needs    Long-term goal(s): In 6 months, Ms. Camargo will:  1.  Report a week of typical vocal activities, in which dysphonia and discomfort do not exceed a level of 2 out of 10, 80% of the time     This treatment plan was developed with the patient who agreed with the recommendations.    PRIMARY ICD-10 code:  R49.0 (Dysphonia)    TOTAL SERVICE TIME: 60 minutes  EVALUATION OF VOICE AND RESONANCE: (96552): 60 minutes    NO CHARGE FACILITY FEE (94776)      Jeanne Abreu, Ph.D., [facility]-SLP  Speech-Language Pathologist  Director, [facility]  [phone]

## 2018-06-21 NOTE — PROGRESS NOTES
"[facility] VOICE CLINIC  Evaluation report    Clinician: Mitchell Bustamante M.M., M.A., CCC/SLP  Seen in conjunction with: Dr. Nam  Referring physician:   ***  Patient: Radha Camargo  Date of Visit: 6/21/2018    HISTORY  Chief complaint: Radha Camargo is a 42 year old woman presenting today for evaluation of voice quality and throat disccomfort.    Onset: Gradually 8 months ago  Inciting incident: none obvious  Course: {Evaluation - course:744507}  Salient history: She has a history significant for ***.    CURRENT SYMPTOMS INCLUDE  ***VOICE    ***    ***COUGH/THROAT CLEARING    ***     her cough/ throat clearing triggers include:  o ***    ***SWALLOWING    ***    ***BREATHING    ***    ***ADDITIONAL    ***    Patient denies significant {[facility] Basic Symptoms:836796}.     OTHER PERTINENT HISTORY    {HISTORY:311492152}    No past medical history on file.  No past surgical history on file.    OBJECTIVE  PATIENT REPORTED MEASURES    {[facility] PATIENT REPORTED MEASURES:272159}  Patient Supplied Answers To VHI Questionnaire  Voice Handicap Index (VHI-10) 6/14/2018   My voice makes it difficult for people to hear me 3   People have difficulty understanding me in a noisy room 3   My voice difficulties restrict my personal and social life.  2   I feel left out of conversations because of my voice 2   My voice problem causes me to lose income 0   I feel as though I have to strain to produce voice 3   The clarity of my voice is unpredictable 3   My voice problem upsets me 3   My voice makes me feel handicapped 1   People ask, \"What's wrong with your voice?\" 4   VHI-10 24       Patient Supplied Answers To CSI Questionnaire  Cough Severity Index (CSI) 6/14/2018   My cough is worse when I lie down 0   My coughing problem causes me to restrict my personal and social life 0   I tend to avoid places because of my cough problem 0   I feel embarrassed because of my coughing problem 0   People ask, ''What's wrong?'' because I cough a lot 0   I " run out of air when I cough 0   My coughing problem affects my voice 0   My coughing problem limits my physical activity 0   My coughing problem upsets me 0   People ask me if I am sick because I cough a lot 0   CSI Score 0       Patient Supplied Answers To EAT Questionnaire  Eating Assessment Tool (EAT-10) 2018   My swallowing problem has caused me to lose weight 0   My swallowing problem interferes with my ability to go out for meals 0   Swallowing liquids takes extra effort 0   Swallowing solids takes extra effort 0   Swallowing pills takes extra effort 2   Swallowing is painful 3   The pleasure of eating is affected by my swallowing 2   When I swallow food sticks in my throat 2   I cough when I eat 1   Swallowing is stressful 1   EAT-10 11           PERCEPTUAL EVALUATION (CPT 60664)  POSTURE / TENSION:     {Perceptual Tension:800256}    BREATHING:     {Perceptual Breathin}    LARYNGEAL PALPATION:     {Laryngeal Palpation:651676}    VOICE:    Roughness: {[facility] VOICE SEVERITY RATINGS:192306}    Breathiness: {[facility] VOICE SEVERITY RATINGS:295374}    Strain: {[facility] VOICE SEVERITY RATINGS:642512}    ***{Other Perceptual Categories:615276}    Loudness    Conversational speech:  {Perceptual Loudness:217446}    Projected speech:  {Perceptual Loudness:276935}    Pitch:    Conversational speech:  {Perceptual Pitch:427662}    Pitch glide: ***    Resonance:    Conversational speech:  {Perceptual Resonance:631436}    Singing vs. Speech:  ***    CAPE-V Overall Severity:  ***/100    COUGH/THROAT CLEARING:    {[facility] SX-COUGH:722520}    THERAPY PROBES: Improvement was elicited with {Therapy Probe Response:543397}    ***    ASSESSMENT / PLAN  IMPRESSIONS: Radha Camargo is presenting today with {[facility]-ICD-10 CODES:556391} in the context of {[facility] E[facility]-ICD-10 CODES:582105}. ***{DYSPHONIA:031452814}.    STIMULABILITY: results of therapy probes during perceptual and laryngeal evaluation demonstrate improvement with  "{Therapy Probe Response:065006}    RECOMMENDATIONS:     {Therapy recommendations:071966}    She demonstrates a {PROGNOSIS:697410056::\"Good\"} prognosis for improvement given adherence to therapeutic recommendations.     Positive indicators: {[facility] Positive Prognostic Indicators:541155}    Negative indicators: ***    DURATION / FREQUENCY: *** one-hour sessions    ***GOALS:  Patient goal:   1. {GOALS:126129656}    Short-term goal(s): Within the first 4 sessions, Ms. Camargo:  1. {DWLVCGOALS:810992}    Long-term goal(s): In *** months, Ms. Camargo will:  1. {LONG TERM GOALS:626765699}    ***  G-Codes:  {[facility] ENT-G-CODES LIST:499150}  Certification period: June 21, 2018 - [unfilled]    This treatment plan was developed with the patient who agreed with the recommendations.    ***SAMEDAY THERAPY NOTE GOES HERE, USE .DWSPTXSAMEDAY***    TOTAL SERVICE TIME: *** minutes  {LOS:245932::\"NO CHARGE FACILITY FEE (10801)\"}    ***      "

## 2018-06-21 NOTE — NURSING NOTE
"Chief Complaint   Patient presents with     Consult     chronic throat pain/hoarseness     Blood pressure 127/88, pulse 114, height 1.588 m (5' 2.5\"), weight 78.5 kg (173 lb), not currently breastfeeding.    Kaiden Powell    "

## 2018-06-21 NOTE — PATIENT INSTRUCTIONS
Plan of care:  Follow up with Dr Nam as needed  Clinic contact information:  1. To schedule an appointment call [phone], option 1  2. To talk to the Triage RN call [phone], option 3  3. If you need to speak to Elizabeth or get a message to your doctor on a Friday, call the triage RN  4. ElizabethRN: [phone]  5. Surgery scheduling:      Marley Saab: [phone]      Kimmy Conteh: [phone]  6. Fax: [phone]  7. [phone]

## 2018-06-21 NOTE — LETTER
6/21/2018       RE: Radha Camargo  [address]  [place] [address]     Dear Colleague,    Thank you for referring your patient, Radha Camargo, to the [facility] EAR NOSE AND THROAT at [facility]. Please see a copy of my visit note below.      HISTORY OF PRESENT ILLNESS: Radha Camargo is a 42 year old female with a history of hoarseness and intermittent anterior neck pain over the last 10-11 months.  She has tried reflux medications as well as anti-histamines.  All of these have not been effective.  She notes she quit smoking 2-3 years ago but did not have a voice problem while smoking or early after sensation of using cigarettes.  She works as a [employer] company does not talk excessively has had no recent changes in her work or home environment.  She does complain of increased effort with talking irritation and ache in the throat and dryness of the throat.  She will have voice breaks changes in volume changes in range.  She will complain of a gravelly voice and a scratchy voice.  She does drink 16 ounces of caffeine in a day.  She notes that her voice problems will make it difficult for people to hear her and will have difficulty in a noisy room.  She has no airway difficulties and no swallowing difficulties.  She is healthy in general.    Last 2 Scores for Patient-Answered VHI Questionnaire  VHI Total Score 6/14/2018   VHI Total Score 21       Last 2 Scores for Patient-Answered CSI Questionnaire  CSI Total Score 6/14/2018   CSI Total Score 0         Last 2 Scores for Patient-Answered EAT Questionnaire  EAT Total Score 6/14/2018   EAT Total Score 11           PAST MEDICAL HISTORY: Menorrhagia, weight gain and hoarseness    PAST SURGICAL HISTORY: No prior head and neck surgery    FAMILY HISTORY:   Family History   Problem Relation Age of Onset     Breast Cancer Mother 61     Other Cancer Mother 30     ovarian     Hypertension Mother      Anxiety Disorder Mother      Depression  Mother      Obesity Mother      Thyroid Disease Maternal Grandmother      Obesity Maternal Grandmother      Coronary Artery Disease Paternal Grandmother      Diabetes Paternal Grandfather      Unknown/Adopted Paternal Grandfather      Colon Cancer Brother      Other Cancer Sister      Thyroid Disease Cousin      Obesity Cousin        SOCIAL HISTORY:   Social History   Substance Use Topics     Smoking status: Never Smoker     Smokeless tobacco: Never Used     Alcohol use No       REVIEW OF SYSTEMS: Ten point review of systems was performed and is negative except for:   [facility] ENT ROS 6/14/2018   Constitutional Weight gain, Unexplained fatigue   Neurology Headache   Ears, Nose, Throat Sore throat, Trouble swallowing, Hoarseness   Musculoskeletal Sore or stiff joints, Swollen joints, Swollen legs/feet        ALLERGIES: Seasonal allergies    MEDICATIONS:   Current Outpatient Prescriptions   Medication Sig Dispense Refill     cholecalciferol (VITAMIN D3) 47695 UNITS capsule Take 1 capsule (50,000 Units) by mouth once a week 8 capsule 0     ferrous sulfate (IRON) 325 (65 FE) MG tablet Take 1 tablet (325 mg) by mouth daily (with breakfast) 30 tablet 2     fluticasone (FLONASE) 50 MCG/ACT spray Spray 1-2 sprays into both nostrils daily 16 g 6     IBUPROFEN PO Take 400 mg by mouth once       levonorgestrel (MIRENA) 20 MCG/24HR IUD 1 each (20 mcg) by Intrauterine route continuous       loratadine (CLARITIN) 10 MG tablet Take 1 tablet (10 mg) by mouth daily (Patient not taking: Reported on 6/21/2018) 90 tablet 1         PHYSICAL EXAMINATION:  She  is awake, alert and in no apparent distress.    Her tympanic membranes are clear and intact bilaterally. External auditory canals are clear.  Nasal exam shows a mild septal deviation without obstruction.  Examination of the oral cavity shows no suspicious lesions.  There is symmetric movement of the tongue and soft palate.    The oropharynx is clear.  Her neck is supple without  significant adenopathy.  There is tenderness to palpation of the thyrohyoid muscle.  Pulse is regular.  Upper airway is clear.  Cranial nerves II-XII are grossly intact.       PROCEDURE: A flexible laryngoscopy  was performed.  Informed consent was obtained and a time out was performed. 3% lidocaine and 0.25% phenylephrine was sprayed into the nasal cavity and allowed 3 to 5 minutes for effect. The scope was passed through the right sided nostril. Examination showed the vocal folds to be mobile and meet in the midline.  No nodules, polyps or ulcerations are seen.  There is mild inflammation or erythema of the supraglottic or glottic larynx.  Additional irritation of the midportion of the vocal folds is seen today without significant mass effect.  With phonation there is moderate to severe contraction of the supraglottic larynx.  The hypopharynx is otherwise clear as is the subglottis.     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  This was reviewed with her both with video and her exam and on her examination.  I am recommending a trial of speech therapy for this.  She did meet with our speech-language pathologist to go over therapy.  Given her mild inflammation of her midportion of the vocal fold I have asked her to follow-up with me after therapy to check for resolution of findings.    Again, thank you for allowing me to participate in the care of your patient.      Sincerely,    Ameya Nam MD

## 2018-06-21 NOTE — LETTER
6/21/2018       RE: Radha Camargo  [address] [address]     Dear Colleague,    Thank you for referring your patient, Radha Camargo, to the [facility]. Please see a copy of my visit note below.    [facility] VOICE CLINIC  Ameya Nam Jr., M.D., F.A.C.S.  Angelica Chaudhary M.D., M.P.H.  Jeanne Abreu, Ph.D., CCC/SLP  Keri Espino M.M. (voice), MKATIE., CCC/SLP  oRbert Bustamante M.M. (voice), MKATIE., CCC/SLP    [facility] VOICE [facility]  VOICE/SPEECH/BREATHING EVALUATION AND LARYNGEAL EXAMINATION REPORT    Patient: Radha Camargo  Date of Visit: 6/21/2018    CHIEF COMPLAINT:  voice    HISTORY  Radha Camargo was seen for initial voice evaluation and laryngeal examination in conjunction with a visit to Dr. Nam.  Please refer to Dr. Nam s dictation for a more complete history and impressions. Salient details of her history are as follows:    Ms. Camargo is a 42 year old with a history of dysphonia.    Symptoms began 10-11 months ago with no obvious precipitating event    Had quit smoking 2-3 years ago, with no voice changes before or after    No improvement with reflux treatment or antibiotics    Most bothered by sore throat, often upon awakening    Unremarkable voice use    DIAGNOSIS/REASON FOR REFERRAL  Dysphonia/ Evaluate, perform laryngeal exam, treat as appropriate    Patient Supplied Answers To [facility] Intake General Questionnaire  [facility] Intake - General Form Review 6/14/2018   Are you having any of these symptoms? Total loss of voice, Increased effort to talk/sing, Throat irritation, Throat tightness, Pain/ache in throat, Dry throat, Lump in throat, Poor voice quality   Are you having any of these symptoms? Voice tires easily, Voice breaks, Change in vocal volume, Change in range, Gravelly voice, Raspy voice, Scratchy voice   Do you use caffeine? Yes   If you do use caffeine, how many oz. do you typically drink in a day? 16   How many oz. of  "non-caffeinated fluid do you typically drink in a day? 32   How often do you experience heartburn, indigestion, chest pain, stomach acid coming up, and/or tasting acid in your mouth or throat? Rarely   Have reflux medications been recommended to you? Yes   If yes, are you taking them regularly? No   Other physicians/health care providers who should receive a copy of today's note (please provide first and last name(s), city): Dr Rodrigo Durham        Patient Supplied Answers To [employer] Intake Voice Questionnaire  No flowsheet data found.    Patient Supplied Answers To VHI Questionnaire  Voice Handicap Index (VHI-10) 6/14/2018   My voice makes it difficult for people to hear me 3   People have difficulty understanding me in a noisy room 3   My voice difficulties restrict my personal and social life.  2   I feel left out of conversations because of my voice 2   My voice problem causes me to lose income 0   I feel as though I have to strain to produce voice 3   The clarity of my voice is unpredictable 3   My voice problem upsets me 3   My voice makes me feel handicapped 1   People ask, \"What's wrong with your voice?\" 4   VHI-10 24       Patient Supplied Answers To [employer] Intake Throat Discomfort Questionnaire  No flowsheet data found.    CURRENT PATIENT COMPLAINTS    Primary: voice, sore throat    Denies significant dyspnea or dysphagia    OTHER PERTINENT HISTORY    Unknown, please see Dr. Nam's dictation    OBJECTIVE FINDINGS  VOICE/ SPEECH/ NON-COMMUNICATIVE LARYNGEAL BEHAVIORS EVALUATION  An evaluation of the voice was accomplished today; salient features are as follows:    Palpation of the laryngeal area shows:    Tenderness of the thyrohyoid area, base of larynx, and thyroid alae     Breathing pattern:    Inspirations can be within normal limits and adequate, are sometimes inadequate for speech in volume and frequency    High clavicular elevation on instruction to take a deep breath    Tension is evident:    Jaw; " obvious clenching    Voice quality is characterized by    WNL quality in conversational speech    Mild backward focus    Habitual pitch is on the low end of normal, at around Eb3    Pitch range in conversational speech is adequate, but very reduced in a reading task    Loudness is WNL and appropriate for the setting    Sustained vowels are weak, with poor airflow    A singing task shows voice quality is consistent between speech and singing, with somewhat better relaxation, in the range from D3 to D4    she states today is a typical voice day, but that her voice can often be much worse    GLOBAL ASSESSMENT OF DYSPHONIA: 33 /100    CAPE-V Overall Severity:   14/100    AERODYNAMIC AND ACOUSTIC EVALUATION  Aerodynamic measures could not be completed today; all laryngeal function measures will be reported when aerodynamic measures are obtained.    LARYNGEAL EXAMINATION    Endoscopic laryngeal examination was performed by:  Ameya Nam MD  I provided technical support, and provided the protocol of instructions for the patient.  Type of exam:   Flexible endoscopy with chip-tip technology     The laryngeal and pharyngeal structures were evaluated for gross appearance, mobility, function, and focal lesions / abnormalities of the associated mucosa.   All findings were within normal limits with the exception of the following salient features:     Moderate collection of thickened secretions, on the vocal folds and throughout the laryngeal area    Vocal fold mucosa is mildly dry, and there is mild erythema at the midfolds bilaterally    Slight resistance or weakness in full abduction during a task of rapid vowel/sniff repetitions    Elongation of vocal folds for pitch increase is limited    Severe constriction in the anterior-posterior dimension of the suplraglottic larynx during phonatory tasks    Vocal folds cannot be seen during connected speech    Epiglottis touches posterior pharyngeal wall    There may be  medio-lateral constriction, but this cannot be seen during speech    Stroboscopy was not warranted    Dr. Nam and I reviewed this laryngeal exam with Ms. Camargo today, and I provided pertinent explanations, as well as written information:    Endoscopic findings are consistent with audio-perceptual assessment and patient Hx/complaints.    her symptoms are accounted for by severe muscular imbalance, especially supraglottic hyperfunction     Because there appears to be a functional component to her symptoms, she would most likely benefit from functional speech therapy.    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    She demonstrates a Good prognosis for improvement given adherence to therapeutic recommendations. Therapeutic     Positive indicators: commitment to process    Negative indicators: none    TREATMENT PLAN  Speech therapy    DURATION/FREQUENCY OF TREATMENT  Six weekly or bi-weekly, one-hour sessions, with two monthly one-hour follow-up sessions, as well as follow-up with Dr. Nam    GOALS  Patient goal:    To have a normal and acceptable voice quality and comfort for all her voice needs    Long-term goal(s): In 6 months, Ms. Camargo will:  1.  Report a week of typical vocal activities, in which dysphonia and discomfort do not exceed a level of 2 out of 10, 80% of the time     This treatment plan was developed with the patient who agreed with the recommendations.    PRIMARY ICD-10 code:  R49.0 (Dysphonia)    TOTAL SERVICE TIME: 60 minutes  EVALUATION OF VOICE AND RESONANCE: (25864): 60 minutes    NO CHARGE FACILITY FEE (83516)      Jeanne Abreu, Ph.D.,  CCC-SLP  Speech-Language Pathologist  Director, [facility]  [phone]

## 2018-06-21 NOTE — MR AVS SNAPSHOT
After Visit Summary   2018    Radha Camargo    MRN: [id number]           Patient Information     Date Of Birth          1975        Visit Information        Provider Department      2018 3:00 PM Ameya Nam MD [facility] Ear Nose and Throat        Today's Diagnoses     Dysphonia    -  1    Laryngeal hyperfunction          Care Instructions    Plan of care:  Follow up with Dr Nam as needed  Clinic contact information:  1. To schedule an appointment call [phone], option 1  2. To talk to the Triage RN call [phone], option 3  3. If you need to speak to Elizabeth or get a message to your doctor on a Friday, call the triage RN  4. ElizabethRN: [phone]  5. Surgery scheduling:      Marley Saab: [phone]      Kimmy Conteh: [phone]  6. Fax: [phone]  7. [phone]            Follow-ups after your visit        Follow-up notes from your care team     Return if symptoms worsen or fail to improve, for after completion of speech therapy.      Who to contact     Please call your clinic at [phone] to:    Ask questions about your health    Make or cancel appointments    Discuss your medicines    Learn about your test results    Speak to your doctor            Additional Information About Your Visit        StayClassyharZOZI Information     [employer] gives you secure access to your electronic health record. If you see a primary care provider, you can also send messages to your care team and make appointments. If you have questions, please call your primary care clinic.  If you do not have a primary care provider, please call [phone] and they will assist you.      [employer] is an electronic gateway that provides easy, online access to your medical records. With [employer], you can request a clinic appointment, read your test results, renew a prescription or communicate with your care team.     To access your existing account, please contact your [facility]  "Physicians Clinic or call [phone] for assistance.        Care EveryWhere ID     This is your Care EveryWhere ID. This could be used by other organizations to access your [place] medical records  [id number]        Your Vitals Were     Pulse Height BMI (Body Mass Index)             114 1.588 m (5' 2.5\") 31.14 kg/m2          Blood Pressure from Last 3 Encounters:   06/21/18 127/88   04/30/18 118/79   04/17/18 129/87    Weight from Last 3 Encounters:   06/21/18 78.5 kg (173 lb)   04/30/18 79.7 kg (175 lb 12.8 oz)   04/17/18 81.6 kg (179 lb 12.8 oz)              We Performed the Following     IMAGESTREAM RECORDING ORDER     LARYNGOSCOPY FLEX FIBEROPTIC, DIAGNOSTIC        Primary Care Provider Office Phone # Fax #    Rodrigo Durham [phone]       [address] [address]        Equal Access to Services     SALINA [facility] CenterSTEPHANIE : Hadii aad ku hadasho Soomaali, waaxda luqadaha, qaybta kaalmada adeegyada, waxay idiin haylivier pascual . So [facility] [phone].    ATENCIÓN: Si habla español, tiene a mueller disposición servicios gratuitos de asistencia lingüística. [facility] [phone].    We comply with applicable federal civil rights laws and [place] laws. We do not discriminate on the basis of race, color, national origin, age, disability, sex, sexual orientation, or gender identity.            Thank you!     Thank you for choosing [facility] EAR NOSE AND THROAT  for your care. Our goal is always to provide you with excellent care. Hearing back from our patients is one way we can continue to improve our services. Please take a few minutes to complete the written survey that you may receive in the mail after your visit with us. Thank you!             Your Updated Medication List - Protect others around you: Learn how to safely use, store and throw away your medicines at www.disposemymeds.org.          This list is accurate as of 6/21/18 11:59 PM.  Always use your most recent " med list.                   Brand Name Dispense Instructions for use Diagnosis    cholecalciferol 13939 units capsule    VITAMIN D3    8 capsule    Take 1 capsule (50,000 Units) by mouth once a week    Vitamin D deficiency       ferrous sulfate 325 (65 Fe) MG tablet    IRON    30 tablet    Take 1 tablet (325 mg) by mouth daily (with breakfast)    Iron deficiency anemia, unspecified iron deficiency anemia type       fluticasone 50 MCG/ACT spray    FLONASE    16 g    Spray 1-2 sprays into both nostrils daily    Chronic non-seasonal allergic rhinitis, unspecified trigger       IBUPROFEN PO      Take 400 mg by mouth once        levonorgestrel 20 MCG/24HR IUD    MIRENA     1 each (20 mcg) by Intrauterine route continuous    Encounter for IUD insertion       loratadine 10 MG tablet    CLARITIN    90 tablet    Take 1 tablet (10 mg) by mouth daily    Chronic non-seasonal allergic rhinitis, unspecified trigger

## 2018-06-22 PROBLEM — J38.7 LARYNGEAL HYPERFUNCTION: Status: ACTIVE | Noted: 2018-06-22

## 2018-06-24 PROBLEM — R49.0 DYSPHONIA: Status: ACTIVE | Noted: 2018-06-24

## 2018-07-13 ENCOUNTER — OFFICE VISIT (OUTPATIENT)
Dept: OTOLARYNGOLOGY | Facility: CLINIC | Age: 43
End: 2018-07-13
Payer: COMMERCIAL

## 2018-07-13 DIAGNOSIS — R49.0 DYSPHONIA: Primary | ICD-10-CM

## 2018-07-13 NOTE — MR AVS SNAPSHOT
After Visit Summary   7/13/2018    Radha Camargo    MRN: [id number]           Patient Information     Date Of Birth          1975        Visit Information        Provider Department      7/13/2018 3:30 PM Jeanne Abreu SLP [facility]        Today's Diagnoses     Dysphonia    -  1      Care Instructions      After Visit Summary    Patient: Radha Camargo  Date of Visit: 7/13/2018    Hygiene:     Systemic Hydration: internal hydration of the entire body  o Drink more water      Topical Hydration: hydration for the surface mucosa of the larynx and vocal folds.  o Stimulate your salivary glands    Suck on ice      Gargling  o With saline      Relieving Extrinsic Muscle Discomfort:    Ice and Heat  o Ice twice a day at least  o Follow with heat; 20 minutes    Stretches  o Tip head back and jut chin to ceiling; no more than 15 seconds at a time, many times per day  o Any neck stretch that feels good; with the regular caveats    Massage  o Run your fingers/knuckles downward along the thyrohyoid muscle  o Hyoid release: like a sustained see-saw action, very gentle, for about thirty seconds each position, then do little see-saws    Splinting  o Apply gentle downward pressure in the thyrohyoid area while you're talking                  Follow-ups after your visit        Your next 10 appointments already scheduled     Jul 31, 2018  3:30 PM CDT   (Arrive by 3:15 PM)   RETURN SLP VOICE with [facility] ([facility])    [address] [place] [id number] [phone]            Aug 14, 2018  3:30 PM CDT   (Arrive by 3:15 PM)   RETURN SLP VOICE with [facility] ([facility])    [address] [place] [address] [phone]              Who to contact     Please call your clinic at [phone] to:    Ask questions about your health    Make or cancel  appointments    Discuss your medicines    Learn about your test results    Speak to your doctor            Additional Information About Your Visit        Nanalysishart Information     [employer] gives you secure access to your electronic health record. If you see a primary care provider, you can also send messages to your care team and make appointments. If you have questions, please call your primary care clinic.  If you do not have a primary care provider, please call [phone] and they will assist you.      [employer] is an electronic gateway that provides easy, online access to your medical records. With [employer], you can request a clinic appointment, read your test results, renew a prescription or communicate with your care team.     To access your existing account, please contact your [facility] or call [phone] for assistance.        Care EveryWhere ID     This is your Care EveryWhere ID. This could be used by other organizations to access your [place] medical records  [id number]         Blood Pressure from Last 3 Encounters:   06/21/18 127/88   04/30/18 118/79   04/17/18 129/87    Weight from Last 3 Encounters:   06/21/18 78.5 kg (173 lb)   04/30/18 79.7 kg (175 lb 12.8 oz)   04/17/18 81.6 kg (179 lb 12.8 oz)              We Performed the Following     LARYNGEAL FUNCTION STUDIES     SPEECH/HEARING THERAPY, INDIVIDUAL        Primary Care Provider Office Phone # Fax #    Rodrigo Durham [phone]       [address] [address]        Equal Access to Services     SAV BOOTH : Hadii cici Kenny, waaxraoul glynn, qaybta kaalmaraoul moreland, tl decker. So [facility] [phone].    ATENCIÓN: Si habla español, tiene a mueller disposición servicios gratuitos de asistencia lingüística. Llame al [phone].    We comply with applicable federal civil rights laws and [place] laws. We do not discriminate on the  basis of race, color, national origin, age, disability, sex, sexual orientation, or gender identity.            Thank you!     Thank you for choosing [facility]  for your care. Our goal is always to provide you with excellent care. Hearing back from our patients is one way we can continue to improve our services. Please take a few minutes to complete the written survey that you may receive in the mail after your visit with us. Thank you!             Your Updated Medication List - Protect others around you: Learn how to safely use, store and throw away your medicines at www.disposemymeds.org.          This list is accurate as of 7/13/18 11:59 PM.  Always use your most recent med list.                   Brand Name Dispense Instructions for use Diagnosis    cholecalciferol 24230 units capsule    VITAMIN D3    8 capsule    Take 1 capsule (50,000 Units) by mouth once a week    Vitamin D deficiency       ferrous sulfate 325 (65 Fe) MG tablet    IRON    30 tablet    Take 1 tablet (325 mg) by mouth daily (with breakfast)    Iron deficiency anemia, unspecified iron deficiency anemia type       fluticasone 50 MCG/ACT spray    FLONASE    16 g    Spray 1-2 sprays into both nostrils daily    Chronic non-seasonal allergic rhinitis, unspecified trigger       IBUPROFEN PO      Take 400 mg by mouth once        levonorgestrel 20 MCG/24HR IUD    MIRENA     1 each (20 mcg) by Intrauterine route continuous    Encounter for IUD insertion       loratadine 10 MG tablet    CLARITIN    90 tablet    Take 1 tablet (10 mg) by mouth daily    Chronic non-seasonal allergic rhinitis, unspecified trigger

## 2018-07-13 NOTE — PATIENT INSTRUCTIONS
After Visit Summary    Patient: Radha Camargo  Date of Visit: 7/13/2018    Hygiene:     Systemic Hydration: internal hydration of the entire body  o Drink more water      Topical Hydration: hydration for the surface mucosa of the larynx and vocal folds.  o Stimulate your salivary glands    Suck on ice      Gargling  o With saline      Relieving Extrinsic Muscle Discomfort:    Ice and Heat  o Ice twice a day at least  o Follow with heat; 20 minutes    Stretches  o Tip head back and jut chin to ceiling; no more than 15 seconds at a time, many times per day  o Any neck stretch that feels good; with the regular caveats    Massage  o Run your fingers/knuckles downward along the thyrohyoid muscle  o Hyoid release: like a sustained see-saw action, very gentle, for about thirty seconds each position, then do little see-saws    Splinting  o Apply gentle downward pressure in the thyrohyoid area while you're talking

## 2018-07-13 NOTE — PROGRESS NOTES
[facility]  Ameya Nam Jr., M.D., F.A.C.S.  Angelica Chaudhary M.D., M.P.H.  Jeanne Abreu, Ph.D., CCC/SLP  Keri Espino M.M. (voice), M.A., CCC/SLP  Robert Bustamante M.M. (voice), M.A., [facility]/SLP    [facility]  VOICE/SPEECH/BREATHING THERAPY PROGRESS REPORT    Patient: Radha Camargo  Date of Service: 7/13/2018  Date of Last Service: 6/21/18    I had the pleasure of seeing Ms. Camargo today, for the 1st  therapy session (CPT code 11678).  She was referred by Dr. Nam, for medically necessary speech therapy to address a diagnosis of:  R49.0 (Dysphonia)   Please refer to her initial evaluation report of 6/21/18 for complete details regarding diagnostic findings.    PROGRESS SINCE LAST SESSION  Ms. Camargo was seen for evaluation on 6/21/18.  At that time, it was determined that she would benefit from a course of speech therapy to address the above diagnosis.  Since then, she states she has not had any change in her symptoms, which is expected, as no therapeutic suggestions were made at the time.    Ms. Camargo states that:    She continues to have pain in her throat even if she doesn't use her voice, and worsens when she uses her voice     Ms. Camargo presents today with the following:  Voice quality:    The same mildly pressed quality with mild backward focus as at her evaluation    Pitch in speech is rather low, at around Eb3 to E3, with limited use of pitch inflection in speech    Pitch range is limited; see below    Singing quality is slightly better than speech    ACOUSTIC AND AERODYNAMIC ASSESSMENT WAS NOT DONE AT THE TIME OF THE INITIAL EVALUATION< DUE TO TIME CONSTRAINTS.  THESE WERE ACCOMPLISHED TODAY: SEE BELOW    Laryngeal Function Studies (CPT 46923)     Acoustic Measures     Protocol / Parameter = result     Fundamental frequency Metrics        /a/ mean F0 = 147 Hz (SD = 1.62 Hz)        /i/ mean F0 = 152 Hz (SD = 1.77 Hz)        [place] Mean f0 = 158 Hz (SD 18.05 Hz)       Glide:   "          Min F0 = 69 Hz           Max F0 = 1477 Hz           Range = 1408 Hz           Notes = very high max F0 is most likely an artifact, as pitch range is perceptually limited     Cepstral Measures        CPPS /a/ = 20.74 dB        CPPS /i/ = 20.01 dB        CPPS \"all voiced\" = 21.66 dB        AVQI (v.3.01) = 2.08     Additional Measures        Harmonic to Noise Ratio /a/ = 25.01 dB        Harmonic to Noise Ratio: [place] passage = 16.98 dB       Jitter (local) /a/ = 0.387 %        Shimmer (local) /a/ = 2.608 %     Aerodynamic Measures     Protocol / Parameter Result Normative Mean (SD)   Vital Capacity     Expiratory Volume 2.59 Liters 3.01 (0.7) Liters   Comfortable Sustained Phonation     Mean SPL 67.04 dB 77.53 (3.9) dB   Mean Pitch 186.3 Hz 197.7 (23.92) Hz   Peak Expiratory Airflow 0.204 Lit/Sec 0.21 (0.09) Lit/Sec   Mean Expiratory Airflow 0.154 Lit/Sec 0.15 (0.07) Lit/Sec   Voicing Efficiency     Peak Air Pressure 5.63 cm H2O 7.49 (2.64) cm H2O   Mean Peak Air Pressure 4.53 cm H2O 5.76 (1.51) cm H2O   Peak Expiratory Airflow 0.241 Lit/Sec 0.22 (0.1) Lit/Sec   Mean Airflow During Voicing 0.171 Lit/Sec 0.13 (0.06) Lit/Sec   Running Speech: All Voiced Stimulus     Mean SPL 61.12 dB    Mean Pitch 146.18 Hz    Peak Expiratory Airflow 0.265 Lit/Sec    Mean Expiratory Airflow 0.146 Lit/Sec    Mean Airflow During Voicing 0.14 Lit/Sec    Running Speech: [place]     Mean SPL 57.99 dB    SPL Range 48.67 dB    Mean Pitch 155.83 Hz    Pitch Range 214.68 Hz    Peak Expiratory Airflow 0.683 Lit/Sec    Mean Expiratory Airflow 0.146 Lit/Sec    Expiratory Volume 3.42 Liters    Mean Airflow During Voicing 0.142 Lit/Sec    Peak Inspiratory Airflow -1.344 Lit/Sec    Inspiratory Volume -2.33 Liters      CPP values show roughness.  Airflow measures are within normal limits, consistent with the nature of the dysphonia, that is mostly caused by supraglottic muscle hyperfunction.    THERAPEUTIC ACTIVITIES  Today Ms. " Raman participated in the following therapeutic activities:    [place] concepts of applying ice, heat, and massage to the tender areas of her neck, in order to reduce pain.  o This brought some relief immediately    [place] concepts and techniques for improving topical and systemic hydration to improve laryngeal function    I provided an after visit summary to help facilitate practice.    IMPRESSIONS/GOALS/PLAN  Ms. Camargo had a productive session of therapy today, working on techniques/strategies/exercises that will help her achieve her goal of reducing  R49.0 (Dysphonia)   And achieving acceptable and comfortable voice use, allowing her to meet personal and professional vocal demands and fully engage in activities of daily living.   She will work on her exercises on a daily basis.    Goals for this practice period:     practice all exercises according to instructions    Plan: I will see Ms. Camargo in a week to work on education, modification, and carryover of therapeutic activities to more complex phonatory tasks.    PRIMARY ICD-10 code:  R49.0 (Dysphonia)    TOTAL SERVICE TIME: 60 minutes  TREATMENT (32080): 45 minutes  LARYNGEAL FUNCTION STUDIES (10682): 15 minutes  NO CHARGE FACILITY FEE (43477)    Jeanne Abreu, Ph.D., [facility]-SLP  Speech-Language Pathologist  Director, [facility]  [phone]

## 2018-07-31 ENCOUNTER — OFFICE VISIT (OUTPATIENT)
Dept: OTOLARYNGOLOGY | Facility: CLINIC | Age: 43
End: 2018-07-31
Payer: COMMERCIAL

## 2018-07-31 DIAGNOSIS — R49.0 DYSPHONIA: Primary | ICD-10-CM

## 2018-07-31 NOTE — PATIENT INSTRUCTIONS
"  After Visit Summary    Patient: Radha Camargo  Date of Visit: 7/31/2018    Breathing:    In the morning and evening (twice daily) for 2-5 minutes:   o Breathe while lying on your tummy with your arms down at your side. Feel the small of your back rise as you inhale.  Turn on to your back, knees up, hands on tummy, and feel your tummy rise as you inhale.  o Inhale = Inflate; Exhale = Deflate  o Ssshhhhhhh on exhale, silence on inhale  o Throughout the day (2-3x/day for 10 seconds to several minutes) check breathing while keeping shoulders relaxed and maintaining awareness of abdominal movement    Breathing Tips:  o Inhale through straw; pretend air goes to the belly-button  o Bend over, let your belly lift you up; come up to elbows, feel the same belly lift     Voice:    Semi-occluded vocal tract exercise:   o Bubbles (straw in 1.5 to 2  of water) 100x/day for 1-2 min:  o 3x: blow 10-15 seconds with no voice and keep bubbles consistent.  o 3x: blow bubbles and add a sustained  hhmmmmm\" (Maybe 110X)  - Sense your voice in the straw, or in your mouth/face, not in your throat  o 3x: blow bubbles and vary  who  gliding up and down             Up and down like a siren  o 3x: blow bubbles on a sustained/ varied pitch softest to loudest to softest (messa di voce)    o 1-2x: Happy birthday bubbles (keep connected)    Play with the straw, with and without bubbles; play with your voice, with and without the straw  These exercises are great for:  *  warm up / cool down - Part of the morning routine and before and after extended voice use.  *  Abdominal breathing and applying optimal breath flow to speech/singing.       "

## 2018-07-31 NOTE — MR AVS SNAPSHOT
"              After Visit Summary   7/31/2018    Radha Camargo    MRN: [id number]           Patient Information     Date Of Birth          1975        Visit Information        Provider Department      7/31/2018 3:30 PM Jeanne Abreu, SLP [facility]        Today's Diagnoses     Dysphonia    -  1      Care Instructions      After Visit Summary    Patient: Radha Camargo  Date of Visit: 7/31/2018    Breathing:    In the morning and evening (twice daily) for 2-5 minutes:   o Breathe while lying on your tummy with your arms down at your side. Feel the small of your back rise as you inhale.  Turn on to your back, knees up, hands on tummy, and feel your tummy rise as you inhale.  o Inhale = Inflate; Exhale = Deflate  o Ssshhhhhhh on exhale, silence on inhale  o Throughout the day (2-3x/day for 10 seconds to several minutes) check breathing while keeping shoulders relaxed and maintaining awareness of abdominal movement    Breathing Tips:  o Inhale through straw; pretend air goes to the belly-button  o Bend over, let your belly lift you up; come up to elbows, feel the same belly lift     Voice:    Semi-occluded vocal tract exercise:   o Bubbles (straw in 1.5 to 2  of water) 100x/day for 1-2 min:  o 3x: blow 10-15 seconds with no voice and keep bubbles consistent.  o 3x: blow bubbles and add a sustained  hhmmmmm\" (Maybe 110X)  - Sense your voice in the straw, or in your mouth/face, not in your throat  o 3x: blow bubbles and vary  who  gliding up and down             Up and down like a siren  o 3x: blow bubbles on a sustained/ varied pitch softest to loudest to softest (messa di voce)    o 1-2x: Happy birthday bubbles (keep connected)    Play with the straw, with and without bubbles; play with your voice, with and without the straw  These exercises are great for:  *  warm up / cool down - Part of the morning routine and before and after extended voice use.  *  Abdominal breathing and applying optimal breath flow to " speech/singing.               Follow-ups after your visit        Your next 10 appointments already scheduled     Aug 14, 2018  3:30 PM CDT   (Arrive by 3:15 PM)   RETURN SLP VOICE with [facility] ([facility])    [address] [place] [id number] [phone]              Who to contact     Please call your clinic at [phone] to:    Ask questions about your health    Make or cancel appointments    Discuss your medicines    Learn about your test results    Speak to your doctor            Additional Information About Your Visit        [employer] Information     [employer] gives you secure access to your electronic health record. If you see a primary care provider, you can also send messages to your care team and make appointments. If you have questions, please call your primary care clinic.  If you do not have a primary care provider, please call [phone] and they will assist you.      SmashChart is an electronic gateway that provides easy, online access to your medical records. With [employer], you can request a clinic appointment, read your test results, renew a prescription or communicate with your care team.     To access your existing account, please contact your [facility] or call [phone] for assistance.        Care EveryWhere ID     This is your Care EveryWhere ID. This could be used by other organizations to access your [facility] medical records  [id number]         Blood Pressure from Last 3 Encounters:   06/21/18 127/88   04/30/18 118/79   04/17/18 129/87    Weight from Last 3 Encounters:   06/21/18 78.5 kg (173 lb)   04/30/18 79.7 kg (175 lb 12.8 oz)   04/17/18 81.6 kg (179 lb 12.8 oz)              We Performed the Following     SPEECH/HEARING THERAPY, INDIVIDUAL        Primary Care Provider Office Phone # Fax #    Rodrigo Durham [phone]       [address] [facility]  [address]  [facility] [address]        Equal Access to Services     SAV BOOTH : Hadii aad ku hadelizabethmunir Kenny, wawaleda gretta, qatl cunha. So [facility] [phone].    ATENCIÓN: Si habla español, tiene a mueller disposición servicios gratuitos de asistencia lingüística. Rod al [phone].    We comply with applicable federal civil rights laws and [place] laws. We do not discriminate on the basis of race, color, national origin, age, disability, sex, sexual orientation, or gender identity.            Thank you!     Thank you for choosing [facility]  for your care. Our goal is always to provide you with excellent care. Hearing back from our patients is one way we can continue to improve our services. Please take a few minutes to complete the written survey that you may receive in the mail after your visit with us. Thank you!             Your Updated Medication List - Protect others around you: Learn how to safely use, store and throw away your medicines at www.disposemymeds.org.          This list is accurate as of 7/31/18 11:59 PM.  Always use your most recent med list.                   Brand Name Dispense Instructions for use Diagnosis    cholecalciferol 87368 units capsule    VITAMIN D3    8 capsule    Take 1 capsule (50,000 Units) by mouth once a week    Vitamin D deficiency       ferrous sulfate 325 (65 Fe) MG tablet    IRON    30 tablet    Take 1 tablet (325 mg) by mouth daily (with breakfast)    Iron deficiency anemia, unspecified iron deficiency anemia type       fluticasone 50 MCG/ACT spray    FLONASE    16 g    Spray 1-2 sprays into both nostrils daily    Chronic non-seasonal allergic rhinitis, unspecified trigger       IBUPROFEN PO      Take 400 mg by mouth once        levonorgestrel 20 MCG/24HR IUD    MIRENA     1 each (20 mcg) by Intrauterine route continuous    Encounter for IUD insertion       loratadine 10 MG tablet    CLARITIN    90 tablet     Take 1 tablet (10 mg) by mouth daily    Chronic non-seasonal allergic rhinitis, unspecified trigger

## 2018-07-31 NOTE — PROGRESS NOTES
"[facility]  Ameya Nam Jr., M.D., F.A.C.S.  Angelica Chaudhary M.D., M.P.H.  Jeanne Abreu, Ph.D., CCC/SLP  Keri Espino M.M. (voice), M.JIL., CCC/SLP  Robert Bustamante M.M. (voice), M.JIL., [facility]/SLP    [facility]  VOICE/SPEECH/BREATHING THERAPY PROGRESS REPORT    Patient: Radha Camargo  Date of Service: 7/31/2018  Date of Last Service: 7/13/18    I had the pleasure of seeing Ms. Camargo today, for the 2nd therapy session (CPT code 54386).  She was referred by Dr. Nam, for medically necessary speech therapy to address a diagnosis of:  R49.0 (Dysphonia)   Please refer to her initial evaluation report of 6/21/18 for complete details regarding diagnostic findings.    PROGRESS SINCE LAST SESSION  At the last session, Ms. Camargo worked on therapeutic activities to improve her vocal comfort, allowing  her to meet personal and professional vocal demands and fully engage in activities of daily living.    Regarding practice, Ms. Camargo reports the following:     Several times per day practice of the massage techniques  o Ice \"really really helps\"  o She doesn't notice the effects of heat  o The massage is helpful; she can get two hours of comfort after massaging if she doesn't have to use her voice, but if she has to raise her voice, the effect is lesser    The after visit summary is helpful to facilitate practice.    Ms. Camargo also states that:    The second she yells, her improved comfort is erased     Ms. Camargo presents today with the following:  Voice quality:    WNL, with mild appropriate glottal bentley    Mild low pitch    THERAPEUTIC ACTIVITIES  Today Ms. Camargo participated in the following therapeutic activities:    [place] exercises for optimal respiratory mechanics for speech.  o I provided explanation of the anatomy and physiology of respiration for speech and singing; she found this to be helpful  o with guidance, learned improved abdominal relaxation for inhalation  o learned techniques for " "optimal airflow on exhalation  o Breathing through a straw was helpful, which surprisedher  o good learning, but will need practice  o acceptable  minimal  improvement in airflow and respiratory mechanics    [place] exercises to add phonation to the optimal flowing airstream.  o Semi-occluded vocal tract exercises with a straw (\"cup and bubbles\") were most facilitating  o She felt an immediate improvement in the sensation of phonation; this pleased her greatly  o [place] a variety of humming exercises  o Instructed to use as a voice warm up, cool down, and coordination of breath flow with phonation.    [place] concepts of an optimal regimen for practice.  o she should use an interval schedule of practice, with brief periods of practice frequently throughout each day  o [place] concepts of volitional practice to facilitate motor learning.    I provided an after visit summary to help facilitate practice.    IMPRESSIONS/GOALS/PLAN  Ms. Camargo had a productive session of therapy today, working on techniques/strategies/exercises that will help her achieve her goal of reducing  R49.0 (Dysphonia)   And achieving acceptable and comfortable voice use, allowing her to meet personal and professional vocal demands and fully engage in activities of daily living.   She will continue to work on her exercises on a daily basis, and work on incorporating the techniques into her daily vocal activities.    Goals for this practice period:     practice all exercises according to instructions    Plan: I will see Ms. Camargo in 2 weeks to work on education, modification, and carryover of therapeutic activities to more complex phonatory tasks.    PRIMARY ICD-10 code:  R49.0 (Dysphonia)    TOTAL SERVICE TIME: 60 minutes  TREATMENT (07513): 60 minutes  NO CHARGE FACILITY FEE (04743)    Jeanne Abreu, Ph.D., [facility]-SLP  Speech-Language Pathologist  Director, [facility]  [phone]                "

## 2018-08-14 ENCOUNTER — OFFICE VISIT (OUTPATIENT)
Dept: OTOLARYNGOLOGY | Facility: CLINIC | Age: 43
End: 2018-08-14
Payer: COMMERCIAL

## 2018-08-14 DIAGNOSIS — R49.0 DYSPHONIA: Primary | ICD-10-CM

## 2018-08-14 NOTE — PROGRESS NOTES
"[facility]  Ameya Nam Jr., M.D., F.A.C.S.  Angelica Chaudhary M.D., M.P.H.  Jeanne Abreu, Ph.D., CCC/SLP  Keri Espino M.M. (voice), M.JIL., CCC/SLP  Robert Bustamante M.M. (voice), MKATIE., [facility]/SLP    [facility]  VOICE/SPEECH/BREATHING THERAPY PROGRESS REPORT    Patient: Rdaha Camargo  Date of Service: 8/14/2018  Date of Last Service: 7/31/18    I had the pleasure of seeing Ms. Camargo today, for the 3rd therapy session (CPT code 81453).  She was referred by Dr. Nam, for medically necessary speech therapy to address a diagnosis of:  R49.0 (Dysphonia)   Please refer to her initial evaluation report of 6/21/18 for complete details regarding diagnostic findings.    PROGRESS SINCE LAST SESSION  At the last session, Ms. Camargo worked on therapeutic activities to improve her voice quality and comfort, allowing  her to meet personal and professional vocal demands and fully engage in activities of daily living.    Regarding practice, Ms. Camargo reports the following:     regular frequent practice of the cup and bubbles exercises    The after visit summary is helpful to facilitate practice.    voice feels better during the exercises, and there is carryover to spontaneous conversation, such that her children have noticed    She catches herself doing the \"short breath;\" she thinks the improved breathing is important  o She is able to switch to an improved technique in mid-conversation, and her voice feels and sounds better  o When she notices it, it is easy to do     Ms. Camargo also states that:    She thinks she is 40% better; she catches herself breathing wrong so often that she knows it isn't habitual  o It is becoming more habitual and more natural  o She feels she will be able to continue to improve with more practice, but would like more techniques     Ms. Camargo presents today with the following:  Voice quality:    Very mild strained quality with a slight fine roughness    THERAPEUTIC " ACTIVITIES  Today Ms. Camargo participated in the following therapeutic activities:    [place] techniques for jaw relaxation during phonation.  o demonstrated moderate jaw tension  o good self-awareness once it was brought to her attention  o Able to maintain reduced jaw tension during all exercises today    [place] exercises to experience a more forward sensation as well as improved airflow during phonation.  o speech material with nasal continuants and with aspirate onsets was facilitating  o able to recognize improvement in quality and comfort  o able to progress to level of sentences, and then conversational speech  o good learning, but will need practice    [place] exercises for improving length of utterance with optimal timing of breathing  o Speech material with increasing phrase lengths was facilitating for learning optimal timing for breathing   o Progressed to conversational speech  o Good learning, but will need practice    An audio recording of today's therapeutic activities was provided, to facilitate practice.    IMPRESSIONS/GOALS/PLAN  Ms. Camargo had a productive session of therapy today, working on techniques/strategies/exercises that will help her achieve her goal of reducing  R49.0 (Dysphonia)   And achieving acceptable and comfortable voice use, allowing her to meet personal and professional vocal demands and fully engage in activities of daily living.   She will continue to work on her exercises on a daily basis, and work on incorporating the techniques into her daily vocal activities.    Goals for this practice period:     practice all exercises according to instructions    incorporate techniques into daily vocal activities    maintain vigilance for vocal technique    Plan: I will see Ms. Camargo in 4-6 weeks to work on education, modification, and carryover of therapeutic activities to more complex phonatory tasks.      PRIMARY ICD-10 code:  R49.0 (Dysphonia)    TOTAL SERVICE TIME: 60  minutes  TREATMENT (14853): 60 minutes  NO CHARGE FACILITY FEE (64865)    Jeanne Abreu, Ph.D., [facility]-SLP  Speech-Language Pathologist  Director, [facility]  [phone]

## 2018-08-14 NOTE — MR AVS SNAPSHOT
After Visit Summary   8/14/2018    Radha Camargo    MRN: [id number]           Patient Information     Date Of Birth          1975        Visit Information        Provider Department      8/14/2018 3:30 PM Jeanne Abreu, SLP [facility]        Today's Diagnoses     Dysphonia    -  1       Follow-ups after your visit        Who to contact     Please call your clinic at [phone] to:    Ask questions about your health    Make or cancel appointments    Discuss your medicines    Learn about your test results    Speak to your doctor            Additional Information About Your Visit        MyChart Information     [employer] gives you secure access to your electronic health record. If you see a primary care provider, you can also send messages to your care team and make appointments. If you have questions, please call your primary care clinic.  If you do not have a primary care provider, please call [phone] and they will assist you.      [employer] is an electronic gateway that provides easy, online access to your medical records. With [employer], you can request a clinic appointment, read your test results, renew a prescription or communicate with your care team.     To access your existing account, please contact your [facility] or call [phone] for assistance.        Care EveryWhere ID     This is your Care EveryWhere ID. This could be used by other organizations to access your [facility] medical records  [id number]         Blood Pressure from Last 3 Encounters:   06/21/18 127/88   04/30/18 118/79   04/17/18 129/87    Weight from Last 3 Encounters:   06/21/18 78.5 kg (173 lb)   04/30/18 79.7 kg (175 lb 12.8 oz)   04/17/18 81.6 kg (179 lb 12.8 oz)              We Performed the Following     SPEECH/HEARING THERAPY, INDIVIDUAL        Primary Care Provider Office Phone # Fax #    Rodrigo Durham [phone]       [address]  [place] [address]        Equal Access to Services     [facility]: Hadii aad ku hadelizabethmunir Payalali, wawaleda aaronmatiasha, qapriscasasha handbhumikatl cohen. So [facility] [phone].    ATENCIÓN: Si habla español, tiene a mueller disposición servicios gratuitos de asistencia lingüística. Rod al [phone].    We comply with applicable federal civil rights laws and [place] laws. We do not discriminate on the basis of race, color, national origin, age, disability, sex, sexual orientation, or gender identity.            Thank you!     Thank you for choosing [facility]  for your care. Our goal is always to provide you with excellent care. Hearing back from our patients is one way we can continue to improve our services. Please take a few minutes to complete the written survey that you may receive in the mail after your visit with us. Thank you!             Your Updated Medication List - Protect others around you: Learn how to safely use, store and throw away your medicines at www.disposemymeds.org.          This list is accurate as of 8/14/18 11:59 PM.  Always use your most recent med list.                   Brand Name Dispense Instructions for use Diagnosis    cholecalciferol 61112 units capsule    VITAMIN D3    8 capsule    Take 1 capsule (50,000 Units) by mouth once a week    Vitamin D deficiency       ferrous sulfate 325 (65 Fe) MG tablet    IRON    30 tablet    Take 1 tablet (325 mg) by mouth daily (with breakfast)    Iron deficiency anemia, unspecified iron deficiency anemia type       fluticasone 50 MCG/ACT spray    FLONASE    16 g    Spray 1-2 sprays into both nostrils daily    Chronic non-seasonal allergic rhinitis, unspecified trigger       IBUPROFEN PO      Take 400 mg by mouth once        levonorgestrel 20 MCG/24HR IUD    MIRENA     1 each (20 mcg) by Intrauterine route continuous    Encounter for IUD insertion       loratadine 10 MG tablet    CLARITIN    90 tablet    Take 1 tablet  (10 mg) by mouth daily    Chronic non-seasonal allergic rhinitis, unspecified trigger

## 2018-10-23 ENCOUNTER — DOCUMENTATION ONLY (OUTPATIENT)
Dept: FAMILY MEDICINE | Facility: CLINIC | Age: 43
End: 2018-10-23

## 2018-10-23 ENCOUNTER — OFFICE VISIT (OUTPATIENT)
Dept: FAMILY MEDICINE | Facility: CLINIC | Age: 43
End: 2018-10-23
Payer: COMMERCIAL

## 2018-10-23 VITALS
TEMPERATURE: 98.1 F | BODY MASS INDEX: 29.95 KG/M2 | HEART RATE: 66 BPM | SYSTOLIC BLOOD PRESSURE: 111 MMHG | OXYGEN SATURATION: 100 % | WEIGHT: 166.4 LBS | DIASTOLIC BLOOD PRESSURE: 78 MMHG

## 2018-10-23 DIAGNOSIS — Z00.00 ENCOUNTER FOR PREVENTATIVE ADULT HEALTH CARE EXAMINATION: Primary | ICD-10-CM

## 2018-10-23 DIAGNOSIS — G47.10 EXCESSIVE SLEEPINESS: ICD-10-CM

## 2018-10-23 DIAGNOSIS — Z80.3 FAMILY HISTORY OF MALIGNANT NEOPLASM OF BREAST: ICD-10-CM

## 2018-10-23 LAB
CHOLEST SERPL-MCNC: 199.2 MG/DL (ref 0–200)
CHOLEST/HDLC SERPL: 3 {RATIO} (ref 0–5)
GLUCOSE SERPL-MCNC: 82 MG'DL (ref 70–99)
HDLC SERPL-MCNC: 66.4 MG/DL
LDLC SERPL CALC-MCNC: 125 MG/DL (ref 0–129)
TRIGL SERPL-MCNC: 38.7 MG/DL (ref 0–150)
VLDL CHOLESTEROL: 7.7 MG/DL (ref 7–32)

## 2018-10-23 RX ORDER — CHOLECALCIFEROL (VITAMIN D3) 50 MCG
2000 TABLET ORAL DAILY
Qty: 100 TABLET | Refills: 3 | COMMUNITY
Start: 2018-10-23

## 2018-10-23 NOTE — MR AVS SNAPSHOT
After Visit Summary   10/23/2018    Radha Camargo    MRN: [id number]           Patient Information     Date Of Birth          1975        Visit Information        Provider Department      10/23/2018 1:00 PM Rodrigo Durham MD [facility]        Today's Diagnoses     Encounter for preventative adult health care examination    -  1    Routine general medical examination at a health care facility        Family history of malignant neoplasm of breast        Excessive sleepiness          Care Instructions    Here is the plan from today's visit    1. Encounter for preventative adult health care examination  I'll fax the form   - Lipid [place] ([facility])  - Glucose ([facility])  - HIV Antigen Antibody Combo    2. Routine general medical examination at a health care facility      3. Family history of malignant neoplasm of breast  They should call you or you can call them  - GENETICS REFERRAL    4. Excessive sleepiness  Call to arrange   - SLEEP EVALUATION & MANAGEMENT REFERRAL - [facility] [place] [facility]  [phone] (Age 2 and up); Future      Please call or return to clinic if your symptoms don't go away.    Follow up plan  Please make a clinic appointment for follow up with me (RODRIGO DURHAM) in 0ne  Year or as needed.    Thank you for coming to [facility] today.  Lab Testing:  **If you had lab testing today and your results are reassuring or normal they will be mailed to you or sent through [employer] within 7 days.   **If the lab tests need quick action we will call you with the results.  The phone number we will call with results is # [phone] (home) . If this is not the best number please call our clinic and change the number.  Medication Refills:  If you need any refills please call your pharmacy and they will contact us.   If you need to  your refill at a new pharmacy, please contact the new pharmacy directly. The new  pharmacy will help you get your medications transferred faster.   Scheduling:  If you have any concerns about today's visit or wish to schedule another appointment please call our office during normal business hours [phone] (8-5:00 M-F)  If a referral was made to a [facility] and you don't get a call from central scheduling please call [phone].  If a Mammogram was ordered for you at [facility] call [phone] to schedule or change your appointment.  If you had an XRay/CT/Ultrasound/MRI ordered the number is [phone] to schedule or change your radiology appointment.   Medical Concerns:  If you have urgent medical concerns please call [phone] at any time of the day.    Rodrigo Durham MD    Preventive Health Recommendations  Female Ages 40 to 49    Yearly exam:     See your health care provider every year in order to  1. Review health changes.   2. Discuss preventive care.    3. Review your medicines if your doctor prescribed any.      Get a Pap test every three years (unless you have an abnormal result and your provider advises testing more often).      If you get Pap tests with HPV test, you only need to test every 5 years, unless you have an abnormal result. You do not need a Pap test if your uterus was removed (hysterectomy) and you have not had cancer.      You should be tested each year for STDs (sexually transmitted diseases), if you're at risk.     Ask your doctor if you should have a mammogram.      Have a colonoscopy (test for colon cancer) if someone in your family has had colon cancer or polyps before age 50.       Have a cholesterol test every 5 years.       Have a diabetes test (fasting glucose) after age 45. If you are at risk for diabetes, you should have this test every 3 years.    Shots: Get a flu shot each year. Get a tetanus shot every 10 years.     Nutrition:     Eat at least 5 servings of fruits and vegetables each day.    Eat whole-grain bread,  whole-wheat pasta and brown rice instead of white grains and rice.    Get adequate Calcium and Vitamin D.      Lifestyle    Exercise at least 150 minutes a week (an average of 30 minutes a day, 5 days a week). This will help you control your weight and prevent disease.    Limit alcohol to one drink per day.    No smoking.     Wear sunscreen to prevent skin cancer.    See your dentist every six months for an exam and cleaning.          Follow-ups after your visit        Additional Services     GENETICS REFERRAL       Your provider has referred you to: [facility]: Adult Genetics Clinic [facility] [phone]   [email or web address]    Please be aware that coverage of these services is subject to the terms and limitations of your health insurance plan.  Call member services at your health plan with any benefit or coverage questions.      Please bring the following with you to your appointment:    (1) Any X-Rays, CTs or MRIs which have been performed.  Contact the facility where they were done to arrange for  prior to your scheduled appointment.   (2) List of current medications   (3) This referral request   (4) Any documents/labs given to you for this referral            SLEEP EVALUATION & MANAGEMENT REFERRAL - [facility] / [facility]  [phone] (Age 2 and up)       Please be aware that coverage of these services is subject to the terms and limitations of your health insurance plan.  Call member services at your health plan with any benefit or coverage questions.      Please bring the following to your appointment:    >>   List of current medications   >>   This referral request   >>   Any documents/labs given to you for this referral                      Future tests that were ordered for you today     Open Future Orders        Priority Expected Expires Ordered    SLEEP EVALUATION & MANAGEMENT REFERRAL - [facility]  [phone] (Age 2 and up) Routine  10/23/2019 10/23/2018            Who to contact     Please call your clinic at [phone] to:    Ask questions about your health    Make or cancel appointments    Discuss your medicines    Learn about your test results    Speak to your doctor            Additional Information About Your Visit        [employer] Information     [email or web address] gives you secure access to your electronic health record. If you see a primary care provider, you can also send messages to your care team and make appointments. If you have questions, please call your primary care clinic.  If you do not have a primary care provider, please call [phone] and they will assist you.      [email or web address] is an electronic gateway that provides easy, online access to your medical records. With [email or web address], you can request a clinic appointment, read your test results, renew a prescription or communicate with your care team.     To access your existing account, please contact your [facility] or call [phone] for assistance.        Care EveryWhere ID     This is your Care EveryWhere ID. This could be used by other organizations to access your [facility] medical records  [id number]        Your Vitals Were     Pulse Temperature Last Period Pulse Oximetry BMI (Body Mass Index)       66 98.1  F (36.7  C) (Oral) 10/17/2018 (Approximate) 100% 29.95 kg/m2        Blood Pressure from Last 3 Encounters:   10/23/18 111/78   06/21/18 127/88   04/30/18 118/79    Weight from Last 3 Encounters:   10/23/18 166 lb 6.4 oz (75.5 kg)   06/21/18 173 lb (78.5 kg)   04/30/18 175 lb 12.8 oz (79.7 kg)              We Performed the Following     GENETICS REFERRAL     Glucose ([facility])     HIV Antigen Antibody Combo     Lipid [place] ([facility])          Today's Medication Changes          These changes are accurate as of 10/23/18  1:51 PM.  If you have any questions, ask your  nurse or doctor.               These medicines have changed or have updated prescriptions.        Dose/Directions    vitamin D 2000 units tablet   This may have changed:  Another medication with the same name was removed. Continue taking this medication, and follow the directions you see here.   Changed by:  Rodrigo Durham MD        Dose:  2000 Units   Take 2,000 Units by mouth daily   Quantity:  100 tablet   Refills:  3         Stop taking these medicines if you haven't already. Please contact your care team if you have questions.     fluticasone 50 MCG/ACT spray   Commonly known as:  FLONASE   Stopped by:  Rodrigo Durham MD           loratadine 10 MG tablet   Commonly known as:  CLARITIN   Stopped by:  Rodrigo Durham MD                    Primary Care Provider Office Phone # Fax #    Rodrigo Durham [phone]       [address] [address]        Equal Access to Services     [facility]: Hadii cici singh hadasho Soodilia, waaxda luqadaha, qaybta kaalmada adeakosuayaraoul, tl pascual . So [facility] [phone].    ATENCIÓN: Si habla español, tiene a mueller disposición servicios gratuitos de asistencia lingüística. [facility] [phone].    We comply with applicable federal civil rights laws and [place] laws. We do not discriminate on the basis of race, color, national origin, age, disability, sex, sexual orientation, or gender identity.            Thank you!     Thank you for choosing [facility]  for your care. Our goal is always to provide you with excellent care. Hearing back from our patients is one way we can continue to improve our services. Please take a few minutes to complete the written survey that you may receive in the mail after your visit with us. Thank you!             Your Updated Medication List - Protect others around you: Learn how to safely use, store and throw away your medicines at www.disposemymeds.org.           This list is accurate as of 10/23/18  1:51 PM.  Always use your most recent med list.                   Brand Name Dispense Instructions for use Diagnosis    ferrous sulfate 325 (65 Fe) MG tablet    IRON    30 tablet    Take 1 tablet (325 mg) by mouth daily (with breakfast)    Iron deficiency anemia, unspecified iron deficiency anemia type       IBUPROFEN PO      Take 400 mg by mouth once        levonorgestrel 20 MCG/24HR IUD    MIRENA     1 each (20 mcg) by Intrauterine route continuous    Encounter for IUD insertion       vitamin D 2000 units tablet     100 tablet    Take 2,000 Units by mouth daily

## 2018-10-23 NOTE — PROGRESS NOTES
Form has been completed by provider.     Form sent out via: Fax to [phone]  Patient informed: No, Reason:fax confirmed  Output date: October 23, 2018    Billie Velasco CMA      **Please close the encounter**

## 2018-10-23 NOTE — PROGRESS NOTES
Female Physical Note          HPI         Concerns today: Continue sore throat and hoarsness seen by ENT -has been to [facility]. Not improved, also reports excessive daytime sleepiness and headaches up to 3 X a week. Never feels well rested.  reports she does snore at times.      Patient Active Problem List   Diagnosis     Irregular menses     Metrorrhagia     Hair loss     Abnormal weight gain     Fatigue, unspecified type     IUD (intrauterine device) in place     Laryngeal hyperfunction     Dysphonia     Encounter for preventative adult health care examination     Family History     Problem (# of Occurrences) Relation (Name,Age of Onset)    Anxiety Disorder (1) Mother    Breast Cancer (1) Mother (61)    Colon Cancer (1) Brother    Coronary Artery Disease (1) Paternal Grandmother    Depression (1) Mother    Diabetes (1) Paternal Grandfather    Hypertension (1) Mother    Obesity (3) Mother, Maternal Grandmother, Cousin    Other Cancer (2) Mother (30): ovarian, Sister    Thyroid Disease (2) Maternal Grandmother, Cousin    Unknown/Adopted (1) Paternal Grandfather          History reviewed. No pertinent past medical history.    Previous Medical Care         Family History   Problem Relation Age of Onset     Breast Cancer Mother 61     Other Cancer Mother 30     ovarian     Hypertension Mother      Anxiety Disorder Mother      Depression Mother      Obesity Mother      Thyroid Disease Maternal Grandmother      Obesity Maternal Grandmother      Coronary Artery Disease Paternal Grandmother      Diabetes Paternal Grandfather      Unknown/Adopted Paternal Grandfather      Colon Cancer Brother      Other Cancer Sister      Thyroid Disease Cousin      Obesity Cousin               Review of Systems:     Review of Systems:  CONSTITUTIONAL: NEGATIVE for fever, chills, change in weight  INTEGUMENTARY/SKIN: NEGATIVE for worrisome rashes, moles or lesions  EYES: NEGATIVE for vision changes or  irritation  ENT/MOUTH: NEGATIVE for ear, mouth and throat problems  RESP: NEGATIVE for significant cough or SOB  BREAST: NEGATIVE for masses, tenderness or discharge  CV: NEGATIVE for chest pain, palpitations or peripheral edema  GI: NEGATIVE for nausea, abdominal pain, heartburn, or change in bowel habits  : NEGATIVE for frequency, dysuria, or hematuria  MUSCULOSKELETAL: NEGATIVE for significant arthralgias or myalgia  NEURO: NEGATIVE for weakness, dizziness or paresthesias, Headaches 2 X week -resolve with sleep and ibuprofen,   ENDOCRINE: NEGATIVE for temperature intolerance, skin/hair changes  HEME/ALLERGY: NEGATIVE for bleeding problems  PSYCHIATRIC: NEGATIVE for changes in mood or affect  Sleep:   Do you snore most or the night (as reported by a family member)? No  Do you feel sleepy or extremely tired during most of the day? Yes              Social History     Social History     Social History     Marital status:      Spouse name: N/A     Number of children: N/A     Years of education: N/A     Occupational History     Not on file.     Social History Main Topics     Smoking status: Never Smoker     Smokeless tobacco: Never Used     Alcohol use No     Drug use: No     Sexual activity: Yes     Other Topics Concern     Not on file     Social History Narrative       Marital Status:  Who lives in your household?      Has anyone hurt you physically, for example by pushing, hitting, slapping or kicking you or forcing you to have sex? Denies  Do you feel threatened or controlled by a partner, ex-partner or anyone in your life? Denies    Sexual Health     Sexual concerns: No   STI History: Neg  Pregnancy History:   LMP Patient's last menstrual period was 10/17/2018 (approximate).   Last Pap Smear Date:   Lab Results   Component Value Date    PAP NIL 2018     Abnormal Pap History: None and Details: Leep procedure 9 years ago   No abnormal since then    Recommended Screening      Cholesterol Level (>46 yo or at risk):  Recommended and patient accepted testing. and Pap/HPV cotest every 5 years for women 30-65   Recommended and patient accepted testing.  Breast CA Screening (>39 yo or 10 y before 1st degree relative diagnosis): Recommended and patient accepted testing.           Physical Exam:     Vitals: /78  Pulse 66  Temp 98.1  F (36.7  C) (Oral)  Wt 166 lb 6.4 oz (75.5 kg)  LMP 10/17/2018 (Approximate)  SpO2 100%  BMI 29.95 kg/m2  BMI= Body mass index is 29.95 kg/(m^2).   GENERAL: healthy, alert and no distress  EYES: Eyes grossly normal to inspection, extraocular movements - intact, and PERRL  NECK: no tenderness, no adenopathy, no asymmetry, no masses, no stiffness; thyroid- normal to palpation  RESP: lungs clear to auscultation - no rales, no rhonchi, no wheezes  BREAST: no masses, no tenderness, no nipple discharge, no palpable axillary masses or adenopathy  CV: regular rates and rhythm, normal S1 S2, no S3 or S4 and no murmur, no click or rub -  ABDOMEN: soft, no tenderness, no  hepatosplenomegaly, no masses, normal bowel sounds  MS: extremities- no gross deformities noted, no edema  SKIN: no suspicious lesions, no rashes  NEURO: strength and tone- normal, sensory exam- grossly normal, mentation- intact, speech- normal, reflexes- symmetric  BACK: no CVA tenderness, no paralumbar tenderness  PSYCH: Alert and oriented times 3; speech- coherent , normal rate and volume; able to articulate logical thoughts, able to abstract reason, no tangential thoughts, no hallucinations or delusions, affect- normal  LYMPHATICS: ant. cervical- normal, post. cervical- normal, axillary- normal, supraclavicular- normal, inguinal- normal      Assessment and Plan      Radha was seen today for physical.    Diagnoses and all orders for this visit:    Encounter for preventative adult health care examination  -     Lipid [place] ([facility])  -     Glucose ([facility])  -     HIV Antigen Antibody  Combo    Family history of malignant neoplasm of breast  -     GENETICS REFERRAL    Excessive sleepiness  I'm very suspicious for STONE to be the cause of her ST, sleepiness and other symptoms   -     SLEEP EVALUATION & MANAGEMENT REFERRAL - ADULT -[facility]  [phone] (Age 2 and up); Future        1. Health Care Maintenance: Normal Physical Exam      1. Mammogram ordered.  2.Contraception: IUD    Options for treatment and follow-up care were reviewed with the patient . Radha Camargo and/or guardian engaged in the decision making process and verbalized understanding of the options discussed and agreed with the final plan.    Rodrigo Durham MD

## 2018-10-23 NOTE — PATIENT INSTRUCTIONS
Here is the plan from today's visit    1. Encounter for preventative adult health care examination  I'll fax the form   - Lipid Cascade ([facility])  - Glucose ([facility])  - HIV Antigen Antibody Combo    2. Routine general medical examination at a health care facility      3. Family history of malignant neoplasm of breast  They should call you or you can call them  - GENETICS REFERRAL    4. Excessive sleepiness  Call to arrange   - SLEEP EVALUATION & MANAGEMENT REFERRAL - ADULT -[facility]  [phone] (Age 2 and up); Future      Please call or return to clinic if your symptoms don't go away.    Follow up plan  Please make a clinic appointment for follow up with me (OSBALDO GARZA) in 0ne  Year or as needed.    Thank you for coming to [facility] today.  Lab Testing:  **If you had lab testing today and your results are reassuring or normal they will be mailed to you or sent through [employer] within 7 days.   **If the lab tests need quick action we will call you with the results.  The phone number we will call with results is # [phone] (home) . If this is not the best number please call our clinic and change the number.  Medication Refills:  If you need any refills please call your pharmacy and they will contact us.   If you need to  your refill at a new pharmacy, please contact the new pharmacy directly. The new pharmacy will help you get your medications transferred faster.   Scheduling:  If you have any concerns about today's visit or wish to schedule another appointment please call our office during normal business hours [phone] (8-5:00 M-F)  If a referral was made to a [facility] and you don't get a call from central scheduling please call [phone].  If a Mammogram was ordered for you at [facility] call [phone] to schedule or change your appointment.  If you had an XRay/CT/Ultrasound/MRI ordered the number  is [phone] to schedule or change your radiology appointment.   Medical Concerns:  If you have urgent medical concerns please call [phone] at any time of the day.    Rodrigo Durham MD    Preventive Health Recommendations  Female Ages 40 to 49    Yearly exam:     See your health care provider every year in order to  1. Review health changes.   2. Discuss preventive care.    3. Review your medicines if your doctor prescribed any.      Get a Pap test every three years (unless you have an abnormal result and your provider advises testing more often).      If you get Pap tests with HPV test, you only need to test every 5 years, unless you have an abnormal result. You do not need a Pap test if your uterus was removed (hysterectomy) and you have not had cancer.      You should be tested each year for STDs (sexually transmitted diseases), if you're at risk.     Ask your doctor if you should have a mammogram.      Have a colonoscopy (test for colon cancer) if someone in your family has had colon cancer or polyps before age 50.       Have a cholesterol test every 5 years.       Have a diabetes test (fasting glucose) after age 45. If you are at risk for diabetes, you should have this test every 3 years.    Shots: Get a flu shot each year. Get a tetanus shot every 10 years.     Nutrition:     Eat at least 5 servings of fruits and vegetables each day.    Eat whole-grain bread, whole-wheat pasta and brown rice instead of white grains and rice.    Get adequate Calcium and Vitamin D.      Lifestyle    Exercise at least 150 minutes a week (an average of 30 minutes a day, 5 days a week). This will help you control your weight and prevent disease.    Limit alcohol to one drink per day.    No smoking.     Wear sunscreen to prevent skin cancer.    See your dentist every six months for an exam and cleaning.

## 2018-10-24 LAB — HIV 1+2 AB+HIV1 P24 AG SERPL QL IA: NONREACTIVE

## 2018-10-30 ENCOUNTER — OFFICE VISIT (OUTPATIENT)
Dept: OTOLARYNGOLOGY | Facility: CLINIC | Age: 43
End: 2018-10-30
Payer: COMMERCIAL

## 2018-10-30 DIAGNOSIS — J03.90 TONSILLITIS: ICD-10-CM

## 2018-10-30 DIAGNOSIS — R49.0 DYSPHONIA: Primary | ICD-10-CM

## 2018-10-30 NOTE — PROGRESS NOTES
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 We last saw her in 6/2018 and recommended a trial of speech therapy for this.  She did meet with our speech-language pathologist to go over therapy, which she completed.  Given her mild inflammation of her midportion of the vocal fold we asked her to follow-up after therapy to check for resolution of findings. She returns to clinic today.     She states the hoarseness and its related muscular tension/soreness has resolved, however, she continues to have throat soreness that is tender and related to her forming tonsilliths. This has been going on for at least 8 months. She received a course of antibiotics without resolution of her pain.  She did not improve with saline gargles but improves mildly whenever she personally removes tonsilliths at home.      Last 2 Scores for Patient-Answered VHI Questionnaire  VHI Total Score 6/14/2018   VHI Total Score 21         Last 2 Scores for Patient-Answered CSI Questionnaire  CSI Total Score 6/14/2018   CSI Total Score 0            Last 2 Scores for Patient-Answered EAT Questionnaire  EAT Total Score 6/14/2018   EAT Total Score 11          PAST MEDICAL HISTORY: Menorrhagia, weight gain and hoarseness     PAST SURGICAL HISTORY: No prior head and neck surgery     FAMILY HISTORY:    Family History           Family History   Problem Relation Age of Onset     Breast Cancer Mother 61     Other Cancer Mother 30       ovarian     Hypertension Mother       Anxiety Disorder Mother       Depression Mother       Obesity Mother       Thyroid Disease Maternal Grandmother       Obesity Maternal Grandmother       Coronary Artery Disease Paternal Grandmother       Diabetes Paternal Grandfather       Unknown/Adopted Paternal Grandfather       Colon Cancer Brother       Other Cancer Sister       Thyroid Disease Cousin       Obesity  Cousin              SOCIAL HISTORY:        Social History   Substance Use Topics     Smoking status: Never Smoker     Smokeless tobacco: Never Used     Alcohol use No         REVIEW OF SYSTEMS: Ten point review of systems was performed and is negative except for:   Uc Ent Review Of Systems      Question    10/30/2018 11:47 AM CDT     Constitutional  Unexplained fatigue     Neurology  Dizzy spells       Headache     Psychology       Eyes       Ears, Nose, Throat  Sore throat       Trouble swallowing     Cardiopulmonary       Gastrointestinal/Genitourinary  Unexplained nausea/vomiting     Musculoskeletal  Sore or stiff joints       Swollen legs/feet     Allergy/Immunology       Hematologic       Endocrine  Heat or cold intolerance     Skin       Other        ALLERGIES: Seasonal allergies     MEDICATIONS:    Active Medications           Current Outpatient Prescriptions   Medication Sig Dispense Refill     cholecalciferol (VITAMIN D3) 59762 UNITS capsule Take 1 capsule (50,000 Units) by mouth once a week 8 capsule 0     ferrous sulfate (IRON) 325 (65 FE) MG tablet Take 1 tablet (325 mg) by mouth daily (with breakfast) 30 tablet 2     fluticasone (FLONASE) 50 MCG/ACT spray Spray 1-2 sprays into both nostrils daily 16 g 6     IBUPROFEN PO Take 400 mg by mouth once         levonorgestrel (MIRENA) 20 MCG/24HR IUD 1 each (20 mcg) by Intrauterine route continuous         loratadine (CLARITIN) 10 MG tablet Take 1 tablet (10 mg) by mouth daily (Patient not taking: Reported on 6/21/2018) 90 tablet 1               PHYSICAL EXAMINATION:  She  is awake, alert and in no apparent distress.    Her tympanic membranes are clear and intact bilaterally. External auditory canals are clear.  Nasal exam shows a mild septal deviation without obstruction.  Examination of the oral cavity shows no suspicious lesions.  There is symmetric movement of the tongue and soft palate.    The oropharynx is clear. Bilateral tonsils are 1+ without  exudate/erythema or tonsilliths on todays exam. Palpation using a cotton tip applicator, there is tenderness on palpation of bilateral tonsils and none over the lateral/posterior pharyngeal walls. This tenderness specifically duplicates the area and character of the pain that is bothering her. Her neck is supple without significant adenopathy.  Tenderness to palpation of the laryngeal musculature has improved. She has some tenderness to palpation of  levels 1b and 2 of the neck.  Pulse is regular.  Upper airway is clear.  Cranial nerves II-XII are grossly intact.        PROCEDURE: A flexible laryngoscopy  was performed.  Informed consent was obtained and a time out was performed. 3% lidocaine and 0.25% phenylephrine was sprayed into the nasal cavity and allowed 3 to 5 minutes for effect. The scope was passed through the right sided nostril. Examination showed the vocal folds to be mobile and meet in the midline.  No nodules, polyps or ulcerations are seen. The mild inflammation of the supraglottis and midportion of the vocal folds is seen a few months ago has now resolved. With phonation there is minimal to no contraction of the supraglottic larynx.  The hypopharynx is otherwise clear as is the subglottis.        IMPRESSION:   1. Muscle tension dysphonia with myofascial pain of the thyrohyoid muscle resolved with speech therapy  2. Chronic tonsillitis  3. Tonsilliths    PLAN:  Her muscle tension dysphonia has responded excellently to speech therapy and she is pleased with that. Given the chronicity and lack of improvement of her tonsillitis and related pain, she is interested in pursuing tonsillectomy. We explained how we cannot promise this will take care completely of her throat pain, however it is a reasonable next step given the chronicity and lack of improvement to conservative therapy. The complications of tonsillectomy, including but not limited to bleeding, infection, continued throat discomfort along with  the complications of a general anaesthetic were reviewed.  She was offered time to consider her multiple options but she was definitive that she wished to proceed with surgery.  She will meet with our nurse to coordinate this at her earliest convenience.      ADDENDUM:  I was present with the resident during the entire clinic visit and procedure.  I have edited Dr. Amy Smith's note to reflect my perspective on operative findings and flow.      Ameya Nam

## 2018-10-30 NOTE — LETTER
10/30/2018       RE: Radha Camargo  [address]  [place] [place] [address]     Dear Colleague,    Thank you for referring your patient, Radha Camargo, to the [facility] at [facility]. Please see a copy of my visit note below.    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 We last saw her in 6/2018 and recommended a trial of speech therapy for this.  She did meet with our speech-language pathologist to go over therapy, which she completed.  Given her mild inflammation of her midportion of the vocal fold we asked her to follow-up after therapy to check for resolution of findings. She returns to clinic today.     She states the hoarseness and its related muscular tension/soreness has resolved, however, she continues to have throat soreness that is tender and related to her forming tonsilliths. This has been going on for at least 8 months. She received a course of antibiotics without resolution of her pain.  She did not improve with saline gargles but improves mildly whenever she personally removes tonsilliths at home.      Last 2 Scores for Patient-Answered VHI Questionnaire  VHI Total Score 6/14/2018   VHI Total Score 21         Last 2 Scores for Patient-Answered CSI Questionnaire  CSI Total Score 6/14/2018   CSI Total Score 0            Last 2 Scores for Patient-Answered EAT Questionnaire  EAT Total Score 6/14/2018   EAT Total Score 11          PAST MEDICAL HISTORY: Menorrhagia, weight gain and hoarseness     PAST SURGICAL HISTORY: No prior head and neck surgery     FAMILY HISTORY:    Family History           Family History   Problem Relation Age of Onset     Breast Cancer Mother 61     Other Cancer Mother 30       ovarian     Hypertension Mother       Anxiety Disorder Mother       Depression Mother       Obesity Mother       Thyroid Disease  Maternal Grandmother       Obesity Maternal Grandmother       Coronary Artery Disease Paternal Grandmother       Diabetes Paternal Grandfather       Unknown/Adopted Paternal Grandfather       Colon Cancer Brother       Other Cancer Sister       Thyroid Disease Cousin       Obesity Cousin              SOCIAL HISTORY:        Social History   Substance Use Topics     Smoking status: Never Smoker     Smokeless tobacco: Never Used     Alcohol use No         REVIEW OF SYSTEMS: Ten point review of systems was performed and is negative except for:   Uc Ent Review Of Systems      Question    10/30/2018 11:47 AM CDT     Constitutional  Unexplained fatigue     Neurology  Dizzy spells       Headache     Psychology       Eyes       Ears, Nose, Throat  Sore throat       Trouble swallowing     Cardiopulmonary       Gastrointestinal/Genitourinary  Unexplained nausea/vomiting     Musculoskeletal  Sore or stiff joints       Swollen legs/feet     Allergy/Immunology       Hematologic       Endocrine  Heat or cold intolerance     Skin       Other        ALLERGIES: Seasonal allergies     MEDICATIONS:    Active Medications           Current Outpatient Prescriptions   Medication Sig Dispense Refill     cholecalciferol (VITAMIN D3) 23289 UNITS capsule Take 1 capsule (50,000 Units) by mouth once a week 8 capsule 0     ferrous sulfate (IRON) 325 (65 FE) MG tablet Take 1 tablet (325 mg) by mouth daily (with breakfast) 30 tablet 2     fluticasone (FLONASE) 50 MCG/ACT spray Spray 1-2 sprays into both nostrils daily 16 g 6     IBUPROFEN PO Take 400 mg by mouth once         levonorgestrel (MIRENA) 20 MCG/24HR IUD 1 each (20 mcg) by Intrauterine route continuous         loratadine (CLARITIN) 10 MG tablet Take 1 tablet (10 mg) by mouth daily (Patient not taking: Reported on 6/21/2018) 90 tablet 1               PHYSICAL EXAMINATION:  She  is awake, alert and in no apparent distress.    Her tympanic membranes are clear and intact bilaterally. External  auditory canals are clear.  Nasal exam shows a mild septal deviation without obstruction.  Examination of the oral cavity shows no suspicious lesions.  There is symmetric movement of the tongue and soft palate.    The oropharynx is clear. Bilateral tonsils are 1+ without exudate/erythema or tonsilliths on todays exam. Palpation using a cotton tip applicator, there is tenderness on palpation of bilateral tonsils and none over the lateral/posterior pharyngeal walls. This tenderness specifically duplicates the area and character of the pain that is bothering her. Her neck is supple without significant adenopathy.   Tenderness to palpation of the laryngeal musculature has improved. She has some tenderness to palpation of  levels 1b and 2 of the neck.  Pulse is regular.  Upper airway is clear.  Cranial nerves II-XII are grossly intact.        PROCEDURE: A flexible laryngoscopy  was performed.  Informed consent was obtained and a time out was performed. 3% lidocaine and 0.25% phenylephrine was sprayed into the nasal cavity and allowed 3 to 5 minutes for effect. The scope was passed through the right sided nostril. Examination showed the vocal folds to be mobile and meet in the midline.  No nodules, polyps or ulcerations are seen. The mild inflammation  of the supraglottis and midportion of the vocal folds is seen a few months ago has now resolved. With phonation there is minimal to no contraction of the supraglottic larynx.  The hypopharynx is otherwise clear as is the subglottis.        IMPRESSION:   1. Muscle tension dysphonia with myofascial pain of the thyrohyoid muscle resolved with speech therapy  2. Chronic tonsillitis  3. Tonsilliths    PLAN:  Her muscle tension dysphonia has responded excellently to speech therapy and she is pleased with that. Given the chronicity and lack of improvement of her tonsillitis and related pain, she is interested in pursuing tonsillectomy. We explained how we cannot promise this will  take care completely of her throat pain, however it is a reasonable next step given the chronicity and lack of improvement to conservative therapy. The complications of tonsillectomy, including but not limited to bleeding, infection, continued throat discomfort along with the complications of a general anaesthetic were reviewed.  She was offered time to consider her multiple options but she was definitive that she wished to proceed with surgery.  She will meet with our nurse to coordinate this at her earliest convenience.      ADDENDUM:  I was present with the resident during the entire clinic visit and procedure.  I have edited Dr. Amy Smith's note to reflect my perspective on operative findings and flow.      Ameya Nam

## 2018-10-30 NOTE — MR AVS SNAPSHOT
After Visit Summary   10/30/2018    Radha Camargo    MRN: [id number]           Patient Information     Date Of Birth          1975        Visit Information        Provider Department      10/30/2018 11:45 AM Jeanne Abreu, SLP [facility]        Today's Diagnoses     Dysphonia    -  1       Follow-ups after your visit        Your next 10 appointments already scheduled     Dec 28, 2018   Procedure with Ameya Nam MD   [facility] ([facility])    [address] [place] [address]  [address] [facility] [id number] [phone]           Located in the Clinics and Surgery Center at [address] [place] [address].   parking is very convenient and highly recommended.  is a $6 flat rate fee.  Both  and self parkers should enter the main arrival plaza from [facility]; parking attendants will direct you based on your parking preference.            Jan 08, 2019  8:15 AM CST   (Arrive by 8:00 AM)   Return Visit with Ameya Nam MD   [facility] Ear Nose and Throat ([facility])    [address] [place] [address]  [address] [facility] [id number] [phone]           This is a multi-disciplinary care team visit as patients with your type of problem are usually seen by a team of an MD and a Speech-Language Pathologist (who is a specialist in disorders of the voice, throat, and breathing).  Please plan about 2 hours for your visit, which will likely include Laryngeal Function Studies, a Voice/Swallow/Breathing Evaluation, and an Endoscopic Laryngeal Examination to provide a comprehensive evaluation.  Please check with your insurance company to ensure you are covered for these services. - It is important to know that if you are evaluated and/or treated by both a physician and a speech pathologist during your visit, your billing will reflect the input that you receive from both providers as  separate professionals. Although most insurance plans do cover these services, we encourage you to contact your insurance company prior to your visit to determine whether there are any coverage limitations that might affect you financially. - Billing/procedure codes that are frequently associated with visits to our clinic include (but are not limited to) the ones listed below. Most patients will not need all of these items, but it may be useful to ask your insurance company's patient . 57904: Flexible fiberoptic laryngoscopy, 43203: Laryngoscopy; flexible or rigid fiberoptic, with stroboscopy, 63373: Flexible endoscopic evaluation of swallowing, 80262: Laryngeal function aerodynamic evaluation, 90857: Evaluation of Voice and Resonance, 40887: Speech pathology treatment for voice, speech, communication, 48621: Speech pathology treatment for swallowing/oral function for feeding - If you have any concerns or questions, or if you would prefer not to have a speech pathologist involved in your visit, please contact our Clinic Coordinator at [phone], at least 24 hours prior to your appointment.              Who to contact     Please call your clinic at [phone] to:    Ask questions about your health    Make or cancel appointments    Discuss your medicines    Learn about your test results    Speak to your doctor            Additional Information About Your Visit        [employer] Information     [employer] gives you secure access to your electronic health record. If you see a primary care provider, you can also send messages to your care team and make appointments. If you have questions, please call your primary care clinic.  If you do not have a primary care provider, please call [phone] and they will assist you.      [employer] is an electronic gateway that provides easy, online access to your medical records. With [employer], you can request a clinic appointment, read your test results, renew a  prescription or communicate with your care team.     To access your existing account, please contact your [facility] or call [phone] for assistance.        Care EveryWhere ID     This is your Care EveryWhere ID. This could be used by other organizations to access your [place] medical records  [id number]        Your Vitals Were     Last Period                   10/17/2018 (Approximate)            Blood Pressure from Last 3 Encounters:   10/23/18 111/78   06/21/18 127/88   04/30/18 118/79    Weight from Last 3 Encounters:   10/23/18 75.5 kg (166 lb 6.4 oz)   06/21/18 78.5 kg (173 lb)   04/30/18 79.7 kg (175 lb 12.8 oz)              We Performed the Following     SPEECH/HEARING THERAPY, INDIVIDUAL        Primary Care Provider Office Phone # Fax #    Rodrigo Durham [phone]       [address] [address]        Equal Access to Services     SAV BOOTH : Hadii cici ku hadasho Soomaali, waaxda luqadaha, qaybta kaalmada adeegyada, tl pascual . So [facility] [phone].    ATENCIÓN: Si habla español, tiene a mueller disposición servicios gratuitos de asistencia lingüística. Llame al [phone].    We comply with applicable federal civil rights laws and [place] laws. We do not discriminate on the basis of race, color, national origin, age, disability, sex, sexual orientation, or gender identity.            Thank you!     Thank you for choosing  [facility]  for your care. Our goal is always to provide you with excellent care. Hearing back from our patients is one way we can continue to improve our services. Please take a few minutes to complete the written survey that you may receive in the mail after your visit with us. Thank you!             Your Updated Medication List - Protect others around you: Learn how to safely use, store and throw away your medicines at www.disposemymeds.org.          This list is accurate  as of 10/30/18 11:59 PM.  Always use your most recent med list.                   Brand Name Dispense Instructions for use Diagnosis    ferrous sulfate 325 (65 Fe) MG tablet    IRON    30 tablet    Take 1 tablet (325 mg) by mouth daily (with breakfast)    Iron deficiency anemia, unspecified iron deficiency anemia type       IBUPROFEN PO      Take 400 mg by mouth once        levonorgestrel 20 MCG/24HR IUD    MIRENA     1 each (20 mcg) by Intrauterine route continuous    Encounter for IUD insertion       vitamin D 2000 units tablet     100 tablet    Take 2,000 Units by mouth daily

## 2018-10-30 NOTE — MR AVS SNAPSHOT
After Visit Summary   10/30/2018    Radha Camargo    MRN: [id number]           Patient Information     Date Of Birth          1975        Visit Information        Provider Department      10/30/2018 11:45 AM Ameya Nam MD [facility]        Today's Diagnoses     Dysphonia    -  1    Tonsillitis           Follow-ups after your visit        Follow-up notes from your care team     Return for 3 weeks after surgery.      Your next 10 appointments already scheduled     Nov 02, 2018  2:00 PM CDT   MA SCREENING DIGITAL BILATERAL with [id number]   [facility] ([facility])    [address] [place] [address], 2nd Floor  [facility] [address] [phone]           How do I prepare for my exam? (Food and drink instructions) No Food and Drink Restrictions.  How do I prepare for my exam? (Other instructions) Do not use any powder, lotion or deodorant under your arms or on your breast. If you do, we will ask you to remove it before your exam.  What should I wear: Wear comfortable, two-piece clothing.  How long does the exam take: Most scans will take 15 minutes.  What should I bring: Bring any previous mammograms from other facilities or have them mailed to the breast center.  Do I need a :  No  is needed.  What do I need to tell my doctor: If you have any allergies, tell your care team.  What should I do after the exam: No restrictions, You may resume normal activities.  What is this test: This test is an x-ray of the breast to look for breast disease. The breast is pressed between two plates to flatten and spread the tissue. An X-ray is taken of the breast from different angles.  Who should I call with questions: If you have any questions, please call the Imaging Department where you will have your exam. Directions, parking instructions, and other information is available on our website, [email or web address].  Other information  "about my exam Three-dimensional (3D) mammograms are available at [place] locations in [facility], [facility], [place], [facility] and [place]. [facility] locations include [place] and [facility] in [place].  Benefits of 3D mammograms include * Improved rate of cancer detection * Decreases your chance of having to go back for more tests, which means fewer: * \"False-positive\" results (This means that there is an abnormal area but it isn't cancer.) * Invasive testing procedures, such as a biopsy or surgery * Can provide clearer images of the breast if you have dense breast tissue.  *3D mammography is an optional exam that anyone can have with a 2D mammogram. It doesn't replace or take the place of a 2D mammogram. 2D mammograms remain an effective screening test for all women.  Not all insurance companies cover the cost of a 3D mammogram. Check with your insurance. Three-dimensional (3D) mammograms are available at [place] locations in [facility], [facility], [facility], and [place]. [facility] locations include [place] and [facility] in [place]. Benefits of 3D mammograms include: - Improved rate of cancer detection - Decreases your chance of having to go back for more tests, which means fewer: - \"False-positive\" results (This means that there is an abnormal area but it isn't cancer.) - Invasive testing procedures, such as a biopsy or surgery - Can provide clearer images of the breast if you have dense breast tissue. 3D mammography is an optional exam that anyone can have with a 2D mammogram. It doesn't replace or take the place of a 2D mammogram. 2D mammograms remain an effective screening test for all women.  Not all insurance companies cover the cost of a 3D mammogram. Check with your insurance.            Dec 28, 2018   Procedure with Ameya Nam MD   [facility] " Center ([facility] [place])    [address] [place] [address]  5th Floor  [facility] [id number] [phone]           Located in the [facility] at [address] [place] [address], [facility] [id number].   parking is very convenient and highly recommended.  is a $6 flat rate fee.  Both  and self parkers should enter the main arrival plaza from [facility]; parking attendants will direct you based on your parking preference.            Jan 08, 2019  8:15 AM CST   (Arrive by 8:00 AM)   Return Visit with Ameya Nam MD   [facility] ([facility])    [address] [place] [address]  4th [facility] [id number] [phone]           This is a multi-disciplinary care team visit as patients with your type of problem are usually seen by a team of an MD and a Speech-Language Pathologist (who is a specialist in disorders of the voice, throat, and breathing).  Please plan about 2 hours for your visit, which will likely include Laryngeal Function Studies, a Voice/Swallow/Breathing Evaluation, and an Endoscopic Laryngeal Examination to provide a comprehensive evaluation.  Please check with your insurance company to ensure you are covered for these services. - It is important to know that if you are evaluated and/or treated by both a physician and a speech pathologist during your visit, your billing will reflect the input that you receive from both providers as separate professionals. Although most insurance plans do cover these services, we encourage you to contact your insurance company prior to your visit to determine whether there are any coverage limitations that might affect you financially. - Billing/procedure codes that are frequently associated with visits to our clinic include (but are not limited to) the ones listed below. Most patients will not need all of these items, but it may be useful to ask your insurance company's patient  . 98279: Flexible fiberoptic laryngoscopy, 27340: Laryngoscopy; flexible or rigid fiberoptic, with stroboscopy, 01789: Flexible endoscopic evaluation of swallowing, 99195: Laryngeal function aerodynamic evaluation, 05713: Evaluation of Voice and Resonance, 68532: Speech pathology treatment for voice, speech, communication, 85078: Speech pathology treatment for swallowing/oral function for feeding - If you have any concerns or questions, or if you would prefer not to have a speech pathologist involved in your visit, please contact our Clinic Coordinator at [phone], at least 24 hours prior to your appointment.              Who to contact     Please call your clinic at [phone] to:    Ask questions about your health    Make or cancel appointments    Discuss your medicines    Learn about your test results    Speak to your doctor            Additional Information About Your Visit        [email or web address] Information     [employer] gives you secure access to your electronic health record. If you see a primary care provider, you can also send messages to your care team and make appointments. If you have questions, please call your primary care clinic.  If you do not have a primary care provider, please call [phone] and they will assist you.      [employer] is an electronic gateway that provides easy, online access to your medical records. With [employer], you can request a clinic appointment, read your test results, renew a prescription or communicate with your care team.     To access your existing account, please contact your [facility] or call [phone] for assistance.        Care EveryWhere ID     This is your Care EveryWhere ID. This could be used by other organizations to access your [place] medical records  [id number]        Your Vitals Were     Last Period                   10/17/2018 (Approximate)            Blood Pressure from Last 3 Encounters:   10/23/18  111/78   06/21/18 127/88   04/30/18 118/79    Weight from Last 3 Encounters:   10/23/18 75.5 kg (166 lb 6.4 oz)   06/21/18 78.5 kg (173 lb)   04/30/18 79.7 kg (175 lb 12.8 oz)              We Performed the Following     IMAGESTREAM RECORDING ORDER     LARYNGOSCOPY FLEX FIBEROPTIC, DIAGNOSTIC     Laura-Operative Worksheet (ENT Adult Default Surgery Request)        Primary Care Provider Office Phone # Fax #    Rodrigo Durham [phone]       [address] [address]        Equal Access to Services     [facility]: Hadii cici signh hadallison Kenny, waaxda lusyedadaha, qaybta kaalmada aniceto, tl pascual . So [facility] [phone].    ATENCIÓN: Si habla español, tiene a mueller disposición servicios gratuitos de asistencia lingüística. [facility] [phone].    We comply with applicable federal civil rights laws and [place] laws. We do not discriminate on the basis of race, color, national origin, age, disability, sex, sexual orientation, or gender identity.            Thank you!     Thank you for choosing [facility] E[facility]SE [facility] TH[facility]AT  for your care. Our goal is always to provide you with excellent care. Hearing back from our patients is one way we can continue to improve our services. Please take a few minutes to complete the written survey that you may receive in the mail after your visit with us. Thank you!             Your Updated Medication List - Protect others around you: Learn how to safely use, store and throw away your medicines at www.disposemymeds.org.          This list is accurate as of 10/30/18 11:59 PM.  Always use your most recent med list.                   Brand Name Dispense Instructions for use Diagnosis    ferrous sulfate 325 (65 Fe) MG tablet    IRON    30 tablet    Take 1 tablet (325 mg) by mouth daily (with breakfast)    Iron deficiency anemia, unspecified iron deficiency anemia type       IBUPROFEN PO      Take 400 mg by mouth once         levonorgestrel 20 MCG/24HR IUD    MIRENA     1 each (20 mcg) by Intrauterine route continuous    Encounter for IUD insertion       vitamin D 2000 units tablet     100 tablet    Take 2,000 Units by mouth daily

## 2018-10-30 NOTE — NURSING NOTE
Chief Complaint   Patient presents with     RECHECK     follow up     Last menstrual period 10/17/2018, not currently breastfeeding.    Kaiden Powell

## 2018-11-02 ENCOUNTER — RADIANT APPOINTMENT (OUTPATIENT)
Dept: MAMMOGRAPHY | Facility: CLINIC | Age: 43
End: 2018-11-02
Attending: FAMILY MEDICINE
Payer: COMMERCIAL

## 2018-11-02 DIAGNOSIS — Z80.3 FAMILY HISTORY OF MALIGNANT NEOPLASM OF BREAST: ICD-10-CM

## 2018-11-02 PROBLEM — J03.90 TONSILLITIS: Status: ACTIVE | Noted: 2018-11-02

## 2018-11-06 NOTE — PROGRESS NOTES
"[facility]  Ameya Nam Jr., M.D., F.A.C.S.  Angelica Chaudhary M.D., M.P.H.  Jeanne Abreu, Ph.D., CCC/SLP  Keri Espino M.M. (voice), M.A., CCC/SLP  Robert Bustamante M.M. (voice), M.A., [facility]SLP    [facility] VOICE [facility]  VOICE/SPEECH/BREATHING THERAPY  CLINICAL FOLLOW-UP AND LARYNGEAL EXAMINATION REPORT    Patient: Radha Camargo  Date of Service: 10/30/2018    HISTORY  PATIENT INFORMATION  Radha Camargo was seen for follow-up in conjunction with a visit to Dr. Nam today.  Please also refer to 's dictation.  She was last seen on 8/14/18.    PROGRESS SINCE LAST VISIT  Ms. Camargo reports:    Voice has been good, and comfortable; the base of tongue feels normal    She continued to improve after her last session; she feels that her muscle tension dysphonia is essentially resolved    Now she is more aware of her sore throat, with a pain that is more like strep throat, and higher than the muscle tension pain  o The back of her throat is \"beet red all the time\"    Her Primary Care physician sent her here for ENT evaluation before trying a sleep study    OBJECTIVE FINDINGS  VOICE AND SPEECH EVALUATION  Ms. Camargo presents today with a voice quality that is characterized by     The same WNL quality she has always had     LARYNGEAL EXAMINATION    Endoscopic laryngeal examination was performed by:  Ameya Nam MD  I provided technical support, and provided the partial protocol of instructions for the patient.  Type of exam:   Flexible endoscopy with chip-tip technology       This exam shows:      Palpation shows the tonsils are the source of pain  o Tonsils are not particularly enlarged or erythematous    Vocal folds have mild bilateral varices, but otherwise WNL    Marked anterior-posterior constriction during speech; less with singing    Dr. Nam and I reviewed this laryngeal exam with Ms. Camargo today:    He believes the source of her sore throat is the tonsils    Although there is still muscle " hyperfunction during speech, it appears that the muscle activity is no longer causing her pain      THERAPEUTIC ACTIVITIES  Today Ms. Camargo participated in the following therapeutic activities:    I provided explanation of Ms. Camargo's course of therapy for Dr. Nam, to help in differential diagnosis    I provided instruction for how to maintain therapeutic gains after tonsillectomy  o We determined that we can discharge Ms. Camargo from therapy today.    IMPRESSIONS/ RECOMMENDATIONS/ PLAN  IMPRESSIONS / RECOMMENDATIONS  IMPRESSIONS: R49.0 (Dysphonia)     Based on today s laryngeal examination, it appears that:    Her tonsils are the source of her pain; after much discussion, she and Dr. Nam agreed to a tonsillectomy    There is no longer a role for therapy; we have agreed to discharge today     TREATMENT PLAN    Ms. Camargo will continue to work on her own.     DISCHARGE SUMMARY  Radha Camargo has had an initial evaluation on 6/21/18 and 4 subsequent therapy sessions. The evaluation showed dysphonia and therefore a course of functional speech therapy was deemed medically necessary. Ms. Camargo has met her goals for better vocal comfort.  She still has a sore throat, and she and Dr. Nam have determined that this is due to tonsil pain.  She will undergo tonsillectomy, and now knows how to maintain her therapeutic gains during this process.  We agree that there is no role for continued speech therapy. Therefore, she is discharged with the goals listed below.    GOALS  Long-term goal(s): In 12 months, Ms. Camargo will:  1.  Report resolution of symptoms, and use of optimal voice quality and comfort to meet personal, social, and professional needs, 90% of the time during a typical week of vocal activities    PRIMARY ICD-10 code:  R49.0 (Dysphonia)    TOTAL SERVICE TIME: 15 minutes  TREATMENT (95015): 15 minutes  NO CHARGE FACILITY FEE (08061)      Jeanne Abreu, Ph.D., [facility]-SLP  Speech-Language Pathologist  Director,  [facility]  [phone]

## 2018-11-07 ENCOUNTER — RADIANT APPOINTMENT (OUTPATIENT)
Dept: MAMMOGRAPHY | Facility: CLINIC | Age: 43
End: 2018-11-07
Attending: FAMILY MEDICINE
Payer: COMMERCIAL

## 2018-11-07 DIAGNOSIS — R92.8 ABNORMAL MAMMOGRAM OF LEFT BREAST: ICD-10-CM

## 2018-12-17 ENCOUNTER — OFFICE VISIT (OUTPATIENT)
Dept: FAMILY MEDICINE | Facility: CLINIC | Age: 43
End: 2018-12-17
Payer: COMMERCIAL

## 2018-12-17 VITALS
OXYGEN SATURATION: 97 % | WEIGHT: 167.4 LBS | RESPIRATION RATE: 14 BRPM | BODY MASS INDEX: 30.13 KG/M2 | HEART RATE: 76 BPM | SYSTOLIC BLOOD PRESSURE: 118 MMHG | DIASTOLIC BLOOD PRESSURE: 81 MMHG

## 2018-12-17 DIAGNOSIS — Z01.818 PREOP GENERAL PHYSICAL EXAM: Primary | ICD-10-CM

## 2018-12-17 LAB
ERYTHROCYTE [DISTWIDTH] IN BLOOD BY AUTOMATED COUNT: 12.2 % (ref 10–15)
HCT VFR BLD AUTO: 37.8 % (ref 35–47)
HGB BLD-MCNC: 12.8 G/DL (ref 11.7–15.7)
MCH RBC QN AUTO: 31.4 PG (ref 26.5–33)
MCHC RBC AUTO-ENTMCNC: 33.9 G/DL (ref 31.5–36.5)
MCV RBC AUTO: 93 FL (ref 78–100)
PLATELET # BLD AUTO: 307 10E9/L (ref 150–450)
RBC # BLD AUTO: 4.07 10E12/L (ref 3.8–5.2)
WBC # BLD AUTO: 8.3 10E9/L (ref 4–11)

## 2018-12-17 NOTE — PROGRESS NOTES
[facility]  [address] [place] [address],  [address]  [facility] [address]  Phone: [phone]  Fax: [phone]    12/17/2018    Adult PRE-OP Evaluation:    Radha Camargo, 1975 presents for pre-operative evaluation and assessment as requested by Dr. Ameya Nam, prior to undergoing surgery/procedure for treatment of bilateral tonsillectomy.    Proposed procedure: bilateral tonsillectomy    Date of Surgery/ Procedure: 12/28/2018  Hospital/Surgical Facility:    [facility]      [address]      Fax: [phone]      Phone: [phone]     Primary Physician: Rodrigo Durham  Type of Anesthesia Anticipated: per surgeon   History of anesthesia complications: NONE  History of  abnormal bleeding: NONE   History of blood transfusions: NO  Patient has a Health Care Directive or Living Will:  NO    Preoperative Questions   1. NO - Do you have a history of heart attack, stroke, stent, bypass or surgery on an artery in the head, neck, heart or legs?  2. NO - Do you ever have any pain or discomfort in your chest?  3. NO - Have you ever had a severe pain across the front of your chest lasting for half an hour or more?  4. NO - Do you have a history of Congestive Heart Failure?  5. NO - Are you troubled by shortness of breath when: walking on the level/ up a slight hill/ at night?  6. NO - Does your chest ever sound wheezy or whistling?  7. NO - Do you currently have a cold, bronchitis or other respiratory infection?  8. NO - Have you had a cold, bronchitis or other respiratory infection within the last 2 weeks?  9. NO - Do you usually have a cough?  10. NO - Do you sometimes get pains in the calves of your legs when you walk?  11. NO - Do you or anyone in your family have previous history of blood clots?   12. NO - Do you or does anyone in your family have a serious bleeding problem such as prolonged bleeding following surgeries or cuts?  13. NO - Have you ever had problems with anemia or  been told to take iron pills?  14. NO - Have you had any abnormal blood loss such as black, tarry or bloody stools, or abnormal vaginal bleeding?  15. NO - Have you ever had a blood transfusion?  16. NO - Have you or any of your relatives ever had problems with anesthesia?  17. NO - Do you have sleep apnea, excessive snoring or daytime drowsiness?  18. NO - Do you have any prosthetic heart valves?  19. NO - Do you have prosthetic joints?  20. NO - Is there any chance that you may be pregnant?    Patient Active Problem List   Diagnosis     Irregular menses     Metrorrhagia     Hair loss     Abnormal weight gain     Fatigue, unspecified type     IUD (intrauterine device) in place     Laryngeal hyperfunction     Dysphonia     Encounter for preventative adult health care examination     Tonsillitis         Current Outpatient Medications on File Prior to Visit:  Cholecalciferol (VITAMIN D) 2000 units tablet Take 2,000 Units by mouth daily   ferrous sulfate (IRON) 325 (65 FE) MG tablet Take 1 tablet (325 mg) by mouth daily (with breakfast)   IBUPROFEN PO Take 400 mg by mouth once   levonorgestrel (MIRENA) 20 MCG/24HR IUD 1 each (20 mcg) by Intrauterine route continuous     No current facility-administered medications on file prior to visit.     OTC products: Daily vitamins and Ibuprofen    Allergies   Allergen Reactions     Seasonal Allergies      Latex Allergy: NO    Social History     Socioeconomic History     Marital status:      Spouse name: None     Number of children: None     Years of education: None     Highest education level: None   Social Needs     Financial resource strain: None     Food insecurity - worry: None     Food insecurity - inability: None     Transportation needs - medical: None     Transportation needs - non-medical: None   Occupational History     None   Tobacco Use     Smoking status: Never Smoker     Smokeless tobacco: Never Used   Substance and Sexual Activity     Alcohol use: No      Drug use: No     Sexual activity: Yes   Other Topics Concern     None   Social History Narrative     None       REVIEW OF SYSTEMS:   Negative 10 point ROS other than intermittent sore throat     EXAM:     Patient Vitals for the past 24 hrs:   BP Pulse Resp SpO2 Weight   12/17/18 1544 118/81 76 14 97 % 75.9 kg (167 lb 6.4 oz)     Body mass index is 30.13 kg/m .  GENERAL: healthy, alert and no distress  EYES: Eyes grossly normal to inspection, extraocular movements - intact, and PERRL  HENT: ear canals- normal; TMs- normal; Nose- normal; Mouth- no ulcers, no lesions. Bilateral tonsillar enlargement.  NECK: no tenderness, no adenopathy, no asymmetry, no masses, no stiffness; thyroid- normal to palpation  RESP: lungs clear to auscultation - no rales, no rhonchi, no wheezes  CV: regular rates and rhythm, normal S1 S2, no S3 or S4 and no murmur, no click or rub -  ABDOMEN: soft, no tenderness, no  hepatosplenomegaly, no masses, normal bowel sounds  MS: extremities- no gross deformities noted, no edema  SKIN: no suspicious lesions, no rashes  NEURO: strength and tone- normal, sensory exam- grossly normal, mentation- intact, speech- normal, reflexes- symmetric  BACK: no CVA tenderness, no paralumbar tenderness  PSYCH: Alert and oriented times 3; speech- coherent , normal rate and volume; able to articulate logical thoughts  LYMPHATICS: ant. cervical- normal, post. cervical- normal    DIAGNOSTICS:      EKG: Not indicated due to non-vascular surgery and low risk of event (age <65 and without cardiac risk factors)    RISK ASSESSMENT:     Cardiovascular Risk:  None:   -The patient does not have chest pain  -Patient does not have a history of congestive heart failure.    -The patient does not have a history of stroke and does not have a history of valvular disease.    Pulmonary Risk:  -In terms of risk factors for pulmonary complication, the patient has no risk factors    Perioperative Complications:  -The patient does not have  a history of bleeding or clotting problems in the past.    -The patient has not had complications from surgeries.    -The patient does not have a family history of any anesthesia or surgical complications.      IMPRESSION:   Reason for surgery/procedure: Tonsillitis     The proposed surgical procedure is considered INTERMEDIATE risk.    For above listed surgery and anesthesia:   Patient is at low risk for surgery/procedure and perioperative/procedure complications.     # Anemia, iron deficiency, stable chronic condition  The patient has a past medical history of anemia controlled on ferrous supplements. Unremarkable most recent hemoglobin, CBC ordered today and pending.     RECOMMENDATIONS:     Labs:  CBC    Fasting:  NPO at midnight the night before surgery.    Preop Plan:  --Approval given to proceed with proposed procedure, without further diagnostic evaluation    Medications:  Patient should hold Ibuprofen 5 days prior to surgery.    Marce Corona, MS3.  Family Medicine Service    Please contact our office if there are any further questions or information required about this patient.    Resident/Fellow Attestation   I, Junior Live, was present with the medical student who participated in the service and in the documentation of the note.  I have verified the history and personally performed the physical exam and medical decision making.  I agree with the assessment and plan of care as documented in the note.        Junior Live, DO  Family Medicine PGY-2  [facility] Family Medicine

## 2018-12-17 NOTE — PROGRESS NOTES
Preceptor Attestation:   Patient seen, evaluated and discussed with the resident. I have verified the content of the note, which accurately reflects my assessment of the patient and the plan of care. Advise discussing code status, medical decision surrogate in the event of complications.   Supervising Physician:  Keysha Scanlon MD

## 2018-12-26 ENCOUNTER — ANESTHESIA EVENT (OUTPATIENT)
Dept: SURGERY | Facility: AMBULATORY SURGERY CENTER | Age: 43
End: 2018-12-26

## 2018-12-27 NOTE — ANESTHESIA PREPROCEDURE EVALUATION
Anesthesia Pre-Procedure Evaluation    Patient: Radha Camargo   MRN:     [id number] Gender:   female   Age:    43 year old :      1975        Preoperative Diagnosis: Tonsillitis   Procedure(s):  Bilateral Tonsillectomy     No past medical history on file.   No past surgical history on file.       Anesthesia Evaluation     .             ROS/MED HX    ENT/Pulmonary:     (+)other ENT- tonsillar hypertrophy, , . .    Neurologic:  - neg neurologic ROS     Cardiovascular:  - neg cardiovascular ROS       METS/Exercise Tolerance:  >4 METS   Hematologic:  - neg hematologic  ROS       Musculoskeletal:  - neg musculoskeletal ROS       GI/Hepatic:  - neg GI/hepatic ROS       Renal/Genitourinary:  - ROS Renal section negative       Endo:  - neg endo ROS       Psychiatric:  - neg psychiatric ROS       Infectious Disease:  - neg infectious disease ROS       Malignancy:         Other:                         PHYSICAL EXAM:   Mental Status/Neuro: A/A/O   Airway: Facies: Feasible  Mallampati: I  Mouth/Opening: Full  TM distance: > 6 cm  Neck ROM: Full   Respiratory: Auscultation: CTAB     Resp. Rate: Normal     Resp. Effort: Normal      CV: Rhythm: Regular  Rate: Age appropriate  Heart: Normal Sounds   Comments:                    Lab Results   Component Value Date    WBC 8.3 2018    HGB 12.8 2018    HCT 37.8 2018     2018    .3 2017    POTASSIUM 4.1 2017    CHLORIDE 103.3 2017    CO2 24.7 2017    BUN 20.5 (H) 2017    CR 0.8 2017    GLC 82.0 10/23/2018    KELSIE 9.4 2017    PROTTOTAL 7.2 2017    ALT 12.4 2017    AST 9.9 2017    ALKPHOS 62.2 2017    BILITOTAL 0.5 2017    TSH 2.68 2018    HCG NEGATIVE 2018       Preop Vitals  BP Readings from Last 3 Encounters:   18 118/81   10/23/18 111/78   18 127/88    Pulse Readings from Last 3 Encounters:   18 76   10/23/18 66   18 114      Resp  "Readings from Last 3 Encounters:   12/17/18 14   04/17/18 16   04/19/17 16    SpO2 Readings from Last 3 Encounters:   12/17/18 97%   10/23/18 100%   04/30/18 99%      Temp Readings from Last 1 Encounters:   10/23/18 36.7  C (98.1  F) (Oral)    Ht Readings from Last 1 Encounters:   06/21/18 1.588 m (5' 2.5\")      Wt Readings from Last 1 Encounters:   12/17/18 75.9 kg (167 lb 6.4 oz)    Estimated body mass index is 30.13 kg/m  as calculated from the following:    Height as of 6/21/18: 1.588 m (5' 2.5\").    Weight as of 12/17/18: 75.9 kg (167 lb 6.4 oz).     LDA:            Assessment:   ASA SCORE: 1    NPO Status: > 6 hours since completed Solid Foods   Documentation: H&P complete; Preop Testing complete; Consents complete   Proceeding: Proceed without further delay  Tobacco Use:  Active user of Tobacco     Plan:   Anes. Type:  General   Pre-Induction: Midazolam IV; Acetaminophen PO   Induction:  IV (Standard)   Airway: Oral ETT   Access/Monitoring: PIV   Maintenance: Balanced   Emergence: Procedure Site   Logistics: Same Day Surgery     Postop Pain/Sedation Strategy:  Standard-Options: Opioids PRN     PONV Management:  Adult Risk Factors: Female, Postop Opioids  Prevention: Ondansetron; Dexamethasone; Propofol Infusion     CONSENT: Direct conversation   Plan and risks discussed with: Patient   Blood Products: Consent Deferred (Minimal Blood Loss)       Comments for Plan/Consent:  Plan:  GA with routine monitors    MD Tyler Corona MD  "

## 2018-12-28 ENCOUNTER — HOSPITAL ENCOUNTER (OUTPATIENT)
Facility: AMBULATORY SURGERY CENTER | Age: 43
End: 2018-12-28
Attending: OTOLARYNGOLOGY
Payer: COMMERCIAL

## 2018-12-28 ENCOUNTER — SURGERY (OUTPATIENT)
Age: 43
End: 2018-12-28
Payer: COMMERCIAL

## 2018-12-28 ENCOUNTER — ANESTHESIA (OUTPATIENT)
Dept: SURGERY | Facility: AMBULATORY SURGERY CENTER | Age: 43
End: 2018-12-28

## 2018-12-28 VITALS
DIASTOLIC BLOOD PRESSURE: 80 MMHG | TEMPERATURE: 97.4 F | SYSTOLIC BLOOD PRESSURE: 133 MMHG | HEART RATE: 78 BPM | BODY MASS INDEX: 29.7 KG/M2 | WEIGHT: 167.6 LBS | OXYGEN SATURATION: 100 % | RESPIRATION RATE: 16 BRPM | HEIGHT: 63 IN

## 2018-12-28 DIAGNOSIS — J03.90 TONSILLITIS: Primary | ICD-10-CM

## 2018-12-28 LAB
HCG UR QL: NEGATIVE
INTERNAL QC OK POCT: YES

## 2018-12-28 RX ORDER — NALOXONE HYDROCHLORIDE 0.4 MG/ML
.1-.4 INJECTION, SOLUTION INTRAMUSCULAR; INTRAVENOUS; SUBCUTANEOUS
Status: DISCONTINUED | OUTPATIENT
Start: 2018-12-28 | End: 2018-12-29 | Stop reason: HOSPADM

## 2018-12-28 RX ORDER — SODIUM CHLORIDE, SODIUM LACTATE, POTASSIUM CHLORIDE, CALCIUM CHLORIDE 600; 310; 30; 20 MG/100ML; MG/100ML; MG/100ML; MG/100ML
INJECTION, SOLUTION INTRAVENOUS CONTINUOUS
Status: DISCONTINUED | OUTPATIENT
Start: 2018-12-28 | End: 2018-12-29 | Stop reason: HOSPADM

## 2018-12-28 RX ORDER — OXYCODONE HYDROCHLORIDE 5 MG/1
5 TABLET ORAL EVERY 4 HOURS PRN
Status: DISCONTINUED | OUTPATIENT
Start: 2018-12-28 | End: 2018-12-29 | Stop reason: HOSPADM

## 2018-12-28 RX ORDER — OXYCODONE HCL 5 MG/5 ML
5 SOLUTION, ORAL ORAL EVERY 4 HOURS PRN
Status: DISCONTINUED | OUTPATIENT
Start: 2018-12-28 | End: 2018-12-28 | Stop reason: HOSPADM

## 2018-12-28 RX ORDER — FENTANYL CITRATE 50 UG/ML
25-50 INJECTION, SOLUTION INTRAMUSCULAR; INTRAVENOUS
Status: DISCONTINUED | OUTPATIENT
Start: 2018-12-28 | End: 2018-12-28 | Stop reason: HOSPADM

## 2018-12-28 RX ORDER — ACETAMINOPHEN 325 MG/1
975 TABLET ORAL ONCE
Status: DISCONTINUED | OUTPATIENT
Start: 2018-12-28 | End: 2018-12-28 | Stop reason: HOSPADM

## 2018-12-28 RX ORDER — MEPERIDINE HYDROCHLORIDE 25 MG/ML
12.5 INJECTION INTRAMUSCULAR; INTRAVENOUS; SUBCUTANEOUS
Status: DISCONTINUED | OUTPATIENT
Start: 2018-12-28 | End: 2018-12-29 | Stop reason: HOSPADM

## 2018-12-28 RX ORDER — HYDRALAZINE HYDROCHLORIDE 20 MG/ML
2.5-5 INJECTION INTRAMUSCULAR; INTRAVENOUS EVERY 10 MIN PRN
Status: DISCONTINUED | OUTPATIENT
Start: 2018-12-28 | End: 2018-12-28 | Stop reason: HOSPADM

## 2018-12-28 RX ORDER — FENTANYL CITRATE 50 UG/ML
25-50 INJECTION, SOLUTION INTRAMUSCULAR; INTRAVENOUS EVERY 5 MIN PRN
Status: DISCONTINUED | OUTPATIENT
Start: 2018-12-28 | End: 2018-12-28 | Stop reason: HOSPADM

## 2018-12-28 RX ORDER — ONDANSETRON 2 MG/ML
INJECTION INTRAMUSCULAR; INTRAVENOUS PRN
Status: DISCONTINUED | OUTPATIENT
Start: 2018-12-28 | End: 2018-12-28

## 2018-12-28 RX ORDER — DEXAMETHASONE SODIUM PHOSPHATE 4 MG/ML
INJECTION, SOLUTION INTRA-ARTICULAR; INTRALESIONAL; INTRAMUSCULAR; INTRAVENOUS; SOFT TISSUE PRN
Status: DISCONTINUED | OUTPATIENT
Start: 2018-12-28 | End: 2018-12-28

## 2018-12-28 RX ORDER — LABETALOL HYDROCHLORIDE 5 MG/ML
10 INJECTION, SOLUTION INTRAVENOUS
Status: DISCONTINUED | OUTPATIENT
Start: 2018-12-28 | End: 2018-12-28 | Stop reason: HOSPADM

## 2018-12-28 RX ORDER — LIDOCAINE HYDROCHLORIDE 10 MG/ML
INJECTION, SOLUTION INFILTRATION; PERINEURAL PRN
Status: DISCONTINUED | OUTPATIENT
Start: 2018-12-28 | End: 2018-12-28

## 2018-12-28 RX ORDER — HYDROMORPHONE HYDROCHLORIDE 1 MG/ML
.3-.5 INJECTION, SOLUTION INTRAMUSCULAR; INTRAVENOUS; SUBCUTANEOUS EVERY 10 MIN PRN
Status: DISCONTINUED | OUTPATIENT
Start: 2018-12-28 | End: 2018-12-29 | Stop reason: HOSPADM

## 2018-12-28 RX ORDER — OXYCODONE HCL 5 MG/5 ML
5 SOLUTION, ORAL ORAL EVERY 4 HOURS PRN
Status: DISCONTINUED | OUTPATIENT
Start: 2018-12-28 | End: 2018-12-29 | Stop reason: HOSPADM

## 2018-12-28 RX ORDER — GABAPENTIN 300 MG/1
300 CAPSULE ORAL ONCE
Status: DISCONTINUED | OUTPATIENT
Start: 2018-12-28 | End: 2018-12-28 | Stop reason: HOSPADM

## 2018-12-28 RX ORDER — ACETAMINOPHEN 325 MG/1
975 TABLET ORAL ONCE
Status: COMPLETED | OUTPATIENT
Start: 2018-12-28 | End: 2018-12-28

## 2018-12-28 RX ORDER — GABAPENTIN 300 MG/1
300 CAPSULE ORAL ONCE
Status: COMPLETED | OUTPATIENT
Start: 2018-12-28 | End: 2018-12-28

## 2018-12-28 RX ORDER — ONDANSETRON 2 MG/ML
4 INJECTION INTRAMUSCULAR; INTRAVENOUS EVERY 30 MIN PRN
Status: DISCONTINUED | OUTPATIENT
Start: 2018-12-28 | End: 2018-12-29 | Stop reason: HOSPADM

## 2018-12-28 RX ORDER — CEFAZOLIN SODIUM 1 G/3ML
INJECTION, POWDER, FOR SOLUTION INTRAMUSCULAR; INTRAVENOUS PRN
Status: DISCONTINUED | OUTPATIENT
Start: 2018-12-28 | End: 2018-12-28

## 2018-12-28 RX ORDER — OXYCODONE HCL 5 MG/5 ML
5-10 SOLUTION, ORAL ORAL EVERY 4 HOURS PRN
Qty: 473 ML | Refills: 0 | Status: SHIPPED | OUTPATIENT
Start: 2018-12-28 | End: 2019-01-04

## 2018-12-28 RX ORDER — PROPOFOL 10 MG/ML
INJECTION, EMULSION INTRAVENOUS PRN
Status: DISCONTINUED | OUTPATIENT
Start: 2018-12-28 | End: 2018-12-28

## 2018-12-28 RX ORDER — FENTANYL CITRATE 50 UG/ML
INJECTION, SOLUTION INTRAMUSCULAR; INTRAVENOUS PRN
Status: DISCONTINUED | OUTPATIENT
Start: 2018-12-28 | End: 2018-12-28

## 2018-12-28 RX ORDER — ONDANSETRON 4 MG/1
4 TABLET, ORALLY DISINTEGRATING ORAL EVERY 30 MIN PRN
Status: DISCONTINUED | OUTPATIENT
Start: 2018-12-28 | End: 2018-12-29 | Stop reason: HOSPADM

## 2018-12-28 RX ORDER — SCOLOPAMINE TRANSDERMAL SYSTEM 1 MG/1
1 PATCH, EXTENDED RELEASE TRANSDERMAL ONCE
Status: COMPLETED | OUTPATIENT
Start: 2018-12-28 | End: 2018-12-28

## 2018-12-28 RX ORDER — FENTANYL CITRATE 50 UG/ML
25-50 INJECTION, SOLUTION INTRAMUSCULAR; INTRAVENOUS
Status: DISCONTINUED | OUTPATIENT
Start: 2018-12-28 | End: 2018-12-29 | Stop reason: HOSPADM

## 2018-12-28 RX ORDER — SODIUM CHLORIDE, SODIUM LACTATE, POTASSIUM CHLORIDE, CALCIUM CHLORIDE 600; 310; 30; 20 MG/100ML; MG/100ML; MG/100ML; MG/100ML
INJECTION, SOLUTION INTRAVENOUS CONTINUOUS
Status: DISCONTINUED | OUTPATIENT
Start: 2018-12-28 | End: 2018-12-28 | Stop reason: HOSPADM

## 2018-12-28 RX ORDER — BUPIVACAINE HYDROCHLORIDE 2.5 MG/ML
INJECTION, SOLUTION INFILTRATION; PERINEURAL PRN
Status: DISCONTINUED | OUTPATIENT
Start: 2018-12-28 | End: 2018-12-28 | Stop reason: HOSPADM

## 2018-12-28 RX ORDER — PROPOFOL 10 MG/ML
INJECTION, EMULSION INTRAVENOUS CONTINUOUS PRN
Status: DISCONTINUED | OUTPATIENT
Start: 2018-12-28 | End: 2018-12-28

## 2018-12-28 RX ORDER — LIDOCAINE 40 MG/G
CREAM TOPICAL
Status: DISCONTINUED | OUTPATIENT
Start: 2018-12-28 | End: 2018-12-28 | Stop reason: HOSPADM

## 2018-12-28 RX ADMIN — FENTANYL CITRATE 50 MCG: 50 INJECTION, SOLUTION INTRAMUSCULAR; INTRAVENOUS at 06:58

## 2018-12-28 RX ADMIN — BUPIVACAINE HYDROCHLORIDE 1 ML: 2.5 INJECTION, SOLUTION INFILTRATION; PERINEURAL at 07:26

## 2018-12-28 RX ADMIN — CEFAZOLIN SODIUM 2 G: 1 INJECTION, POWDER, FOR SOLUTION INTRAMUSCULAR; INTRAVENOUS at 07:08

## 2018-12-28 RX ADMIN — Medication 40 MG: at 07:01

## 2018-12-28 RX ADMIN — ONDANSETRON 4 MG: 2 INJECTION INTRAMUSCULAR; INTRAVENOUS at 07:02

## 2018-12-28 RX ADMIN — SODIUM CHLORIDE, SODIUM LACTATE, POTASSIUM CHLORIDE, CALCIUM CHLORIDE: 600; 310; 30; 20 INJECTION, SOLUTION INTRAVENOUS at 06:26

## 2018-12-28 RX ADMIN — DEXAMETHASONE SODIUM PHOSPHATE 10 MG: 4 INJECTION, SOLUTION INTRA-ARTICULAR; INTRALESIONAL; INTRAMUSCULAR; INTRAVENOUS; SOFT TISSUE at 07:01

## 2018-12-28 RX ADMIN — ACETAMINOPHEN 975 MG: 325 TABLET ORAL at 06:25

## 2018-12-28 RX ADMIN — PROPOFOL 150 MCG/KG/MIN: 10 INJECTION, EMULSION INTRAVENOUS at 07:00

## 2018-12-28 RX ADMIN — FENTANYL CITRATE 50 MCG: 50 INJECTION, SOLUTION INTRAMUSCULAR; INTRAVENOUS at 07:14

## 2018-12-28 RX ADMIN — PROPOFOL 200 MG: 10 INJECTION, EMULSION INTRAVENOUS at 07:00

## 2018-12-28 RX ADMIN — GABAPENTIN 300 MG: 300 CAPSULE ORAL at 06:25

## 2018-12-28 RX ADMIN — LIDOCAINE HYDROCHLORIDE 100 MG: 10 INJECTION, SOLUTION INFILTRATION; PERINEURAL at 07:02

## 2018-12-28 RX ADMIN — SCOLOPAMINE TRANSDERMAL SYSTEM 1 PATCH: 1 PATCH, EXTENDED RELEASE TRANSDERMAL at 06:25

## 2018-12-28 ASSESSMENT — MIFFLIN-ST. JEOR: SCORE: 1386.42

## 2018-12-28 NOTE — ANESTHESIA POSTPROCEDURE EVALUATION
Anesthesia POST Procedure Evaluation    Patient: Radha Camargo   MRN:     [id number] Gender:   female   Age:    43 year old :      1975        Preoperative Diagnosis: Tonsillitis   Procedure(s):  Bilateral Tonsillectomy   Postop Comments: No value filed.       Anesthesia Type:  General    Reportable Event: NO     PAIN: Uncomplicated   Sign Out status: Comfortable, Well controlled pain     PONV: No PONV   Sign Out status:  No Nausea or Vomiting     Neuro/Psych: Uneventful perioperative course   Sign Out Status: Preoperative baseline; Age appropriate mentation     Airway/Resp.: Uneventful perioperative course   Sign Out Status: Non labored breathing, age appropriate RR; Resp. Status within EXPECTED Parameters     CV: Uneventful perioperative course   Sign Out status: Appropriate BP and perfusion indices; Appropriate HR/Rhythm     Disposition:   Sign Out in:  PACU  Disposition:  Phase II; Home  Recovery Course: Uneventful  Follow-Up: Not required           Last Anesthesia Record Vitals:  CRNA VITALS  2018 0719 - 2018 0810      2018             Pulse:  81    SpO2:  98 %    Resp Rate (observed):  29          Last PACU/Preop Vitals:  Vitals:    18 0751 18 0800 18 0806   BP: 121/73 118/68 121/72   Pulse: 75 78    Resp: 16 20 18   Temp: 36.5  C (97.7  F)  36.6  C (97.9  F)   SpO2: 98% 98% 98%         Electronically Signed By: Tyler Awan MD, 2018, 8:10 AM

## 2018-12-28 NOTE — DISCHARGE INSTRUCTIONS
[facility]  Home Care Following Anesthesia  For 24 hours after surgery:  1. Get plenty of rest.  A responsible adult must stay with you for at least 24 hours after you leave the surgery center.  2. Do not drive or use heavy equipment.  If you have weakness or tingling, don't drive or use heavy equipment until this feeling goes away.   3. Do not drink alcohol.   4. Avoid strenuous or risky activities.  Ask for help when climbing stairs.  5. You may feel lightheaded.  IF so, sit for a few minutes before standing.  Have someone help you get up.   6. If you have nausea (feel sick to your stomach): Drink only clear liquids such as apple juice, ginger ale, broth or 7-Up.  Rest may also help.  Be sure to drink enough fluids.  Move to a regular diet as you feel able.   7. You may have a slight fever.  Call the doctor if your fever is over 100 F (37.7 C) (taken under the tongue) or lasts longer than 24 hours.  8. You may have a dry mouth, a sore throat, muscle aches or trouble sleeping. These should go away after 24 hours.  9. Do not make important or legal decisions.   Tips for taking pain medications  To get the best pain relief possible, remember these points:    Take pain medications as directed, before pain becomes severe.    Pain medication can upset your stomach: taking it with food may help.    Constipation is a common side effect of pain medication. Drink plenty of  fluids.    Eat foods high in fiber. Take a stool softener if recommended by your doctor or pharmacist.    Do not drink alcohol, drive or operate machinery while taking pain medications.    Ask about other ways to control pain, such as with heat, ice or relaxation.  Last dose:  975 mg at 6:25 am.  Next dose at 12:25 pm, follow package instructions.    Tylenol/Acetaminophen Consumption  To help encourage the safe use of acetaminophen, the makers of TYLENOL  have lowered the maximum daily dose for single-ingredient  Extra Strength TYLENOL  (acetaminophen) products sold in the [place] from 8 pills per day (4,000 mg) to 6 pills per day (3,000 mg). The dosing interval has also changed from 2 pills every 4-6 hours to 2 pills every 6 hours.    If you feel your pain relief is insufficient, you may take Tylenol/Acetaminophen in addition to your narcotic pain medication.     Be careful not to exceed 3,000 mg of Tylenol/Acetaminophen in a 24 hour period from all sources.    If you are taking extra strength Tylenol/acetaminophen (500 mg), the maximum dose is 6 tablets in 24 hours.    If you are taking regular strength acetaminophen (325 mg), the maximum dose is 9 tablets in 24 hours.    Call a doctor for any of the followin. Signs of infection (fever, growing tenderness at the surgery site, a large amount of drainage or bleeding, severe pain, foul-smelling drainage, redness, swelling).  2. It has been over 8 to 10 hours since surgery and you are still not able to urinate (pass water).  3. Headache for over 24 hours.  Your doctor is:  Dr. Ameya Nam, ENT Otolaryngology: [phone]                  Or dial [phone] and ask for the resident on call for:  ENT Otolaryngology  For emergency care, call the:  [facility]:  [phone] (TTY for hearing impaired: [phone])    Scopolamine Patch- (Absorbed through the skin)    This medicine prevents nausea and vomiting caused by motion sickness or anesthesia.  The medicine is in a patch worn behind the ear.      Do NOT use the Scopolamine Patch if you have glaucoma or are allergic to scopolamine.    How to Use This Medicine:    The patch is applied behind the ear.    Keep the patch dry to prevent it from falling off.  Limit contact with water (no bathing or swimming).      If the patch is loose or falls off throw it away.  You do not need to apply a new patch.    After you take off the patch or if it falls off, wash your hands and the area behind your ear with soap  and water.      You can remove the patch tomorrow, or leave on for up to 3 days.    Only one patch should be used at any time.    How to Dispose of This Medicine:    Fold the used patch in half with the sticky sides together. Throw any used patch away so that children or pets cannot get to it. You will also need to throw away old patches after the expiration date has passed.    Keep all medicine away from children and never share your medicine with anyone.    Warnings While Using This Medicine:    This medicine can make you sleepy.  Avoid taking sleeping pills and other medicines that can make you sleepy while the patch is on.    Do not drink alcohol while the patch is on.    This medicine can cause temporary blurring and other vision problems if it comes in contact with the eyes.  This is not serious unless accompanied by eye pain and redness.     This medicine may cause problems with urination. If you have problems with urinating, remove the patch.  If you are unable to urinate, call your doctor.      This medicine may make you dizzy or drowsy. Avoid driving, using machines, or doing anything else that could be dangerous if the patch is on.    This medicine may make you sweat less and cause your body to get too hot. Be careful in hot weather or if you are exercising.    Make sure any doctor or dentist who treats you knows that you have the patch on. This medicine may affect the results of certain medical tests.    Skin burns have been reported at the patch site in several patients wearing an aluminized transdermal system during a magnetic resonance imaging scan (MRI).  Since this patch contains aluminum, it is recommended to remove the patch if you are having an MRI.    Possible Side Effects While Using This Medicine:    Dry mouth    Drowsiness    Temporary blurring of vision and widening of the pupils    Call your doctor right away if you notice any of these side effects:    Allergic reaction: Itching or hives,  swelling in your face or hands, swelling or tingling in your mouth or throat, chest tightness, trouble breathing.    Blurred vision that does not go away after the patch is removed    Confusion or memory loss    Fast,slow, or uneven heartbeat    Lightheadedness, dizziness, drowsiness, or fainting    Seeing, hearing, or feeling things that are not there    Restlessness    Severe eye pain    Trouble urinating    If you notice other side effects that you think are caused by this medicine, call your doctor immediately.

## 2018-12-28 NOTE — OP NOTE
ENT Operative Report   Date of Operation: December 28, 2018  Staff Surgeon: Ameya Nam MD  Resident(s): Kayli Hernadez MD  Preoperative Diagnosis:    1. Recurrent tonsilliths, chronic tonsillitis    Postoperative Diagnosis: Same  Procedure:    1. Bilateral tonsillectomy    Clinical Indication: tonsilliths with chronic throat pain and discomfort   Anesthesia: General anesthesia   Findings: Bilateral 1+ cryptic tonsils with tonsilliths bilaterally.   EBL: 5mL  Complications: None  Specimens: Bilateral tonsils  Disposition: Stable to the PACU  Description of Procedure:   The patient was brought to the operating room and placed supine on the operating table. The patient was orotracheally intubated under general anesthesia without difficulty. The head was placed into extension and wrapped in the usual fashion. A McIvor mouth gag was placed into the oral cavity and put into suspnesion. A complete bilateral palatine tonsillectomy was then performed by dissecting each tonsil away from the tonsillar fossa along its capsule using  monopolar electrocautery. The tonsillar fossae were carefully inspected and final hemostasis was obtained with electrocautery. The gag was let down for a few moments to allow blood return and the tonsillar fossae were again inspected for evidence and control of bleeding with monopolar cautery. Bupivicaine soaked tonsil sponges were used to anesthetize the tonsillar bed being allowed to sit for ~1 minute.  An orogastric tube was passed and suctioned. Final hemostasis was again achieved with suction electrocautery. The gag was removed.The patient was then extubated and taken to the PACU in satisfactory and stable condition. Estimated blood loss for the procedure was 5 cc. There were no complications. The patient tolerated the procedure and the anesthesia without difficulty.   Dr. Nam was present for the procedure    Kayli Hernadez MD  Otolaryngology-PGY5

## 2018-12-28 NOTE — ANESTHESIA CARE TRANSFER NOTE
Patient: Radha Camargo    Procedure(s):  Bilateral Tonsillectomy    Diagnosis: Tonsillitis  Diagnosis Additional Information: No value filed.    Anesthesia Type:   No value filed.     Note:  Airway :Face Mask  Patient transferred to:PACU  Comments: Uneventful transport to PACU with oxygen face mask 6 lpm  Report to RN Michael Noel  Exchanging well; color pink/natl  Pt comfortable  Pt responds appropriately to command  IV patent  Lips/teeth/dentition as preop status  Questions answered  /73  HR 76 nsr  TAT 97.7  RR 16  Sat 99%Handoff Report: Identifed the Patient, Identified the Reponsible Provider, Reviewed the pertinent medical history, Discussed the surgical course, Reviewed Intra-OP anesthesia mangement and issues during anesthesia, Set expectations for post-procedure period and Allowed opportunity for questions and acknowledgement of understanding      Vitals: (Last set prior to Anesthesia Care Transfer)    CRNA VITALS  12/28/2018 0719 - 12/28/2018 0755      12/28/2018             Pulse:  81    SpO2:  98 %    Resp Rate (observed):  29                Electronically Signed By: JANA KYLE CRNA  December 28, 2018  7:55 AM

## 2018-12-31 ENCOUNTER — TELEPHONE (OUTPATIENT)
Dept: OTOLARYNGOLOGY | Facility: CLINIC | Age: 43
End: 2018-12-31

## 2018-12-31 DIAGNOSIS — J03.90 TONSILLITIS: Primary | ICD-10-CM

## 2018-12-31 LAB — COPATH REPORT: NORMAL

## 2018-12-31 RX ORDER — ONDANSETRON 4 MG/1
4 TABLET, FILM COATED ORAL EVERY 6 HOURS PRN
Qty: 5 TABLET | Refills: 0 | Status: SHIPPED | OUTPATIENT
Start: 2018-12-31 | End: 2019-11-08

## 2018-12-31 NOTE — TELEPHONE ENCOUNTER
[facility] Call [facility]    Phone Message    May a detailed message be left on voicemail: yes    Reason for Call: Other: Pt had her tonsols tooken out last week they put on a patch to stop her form throwing up, but she took it off her self now she need to throw up, is worried its gonna hurt she not sure what to do please call and follow up with pt      Action Taken: Message routed to:  [facility] ([facility]

## 2018-12-31 NOTE — TELEPHONE ENCOUNTER
Called patient regarding message below. Patient states she is still nauseated, but hasn't thrown up since this morning. States she has been drinking a lot of liquids, but has been unable to take solid foods. Still taking oxycodone PRN for pain.   Discussed with Dr. Chaudhary, who suggested prescribing Zofran PRN. Patient informed that prescription would be e-prescribed to pharmacy listed. Encouraged to call back with further questions/concerns.   Carol Quan, RN Care Coordinator      [facility]: Nurse Triage Note  SITUATION/BACKGROUND                                                      Radha Camargo is a 43 year old female who is calling Dr. Nam to report she removed the patch from behind her ear this morning as instructed, has thrown up 3 times since then.  Radha is post bilateral tonsillectomy 12/28.    Description:  As above, removed scopolamine 72 hour patch this morning as instructed to.  Has thrown up 3 times since then.  Onset/duration:  This morning.  Precip. factors:  Surgery Friday  Associated symptoms:  Denies fever, denies any bleeding.  Tolerating fluids prior to this mornings throwing up.  Improves/worsens symptoms:  nothing  Pain scale (0-10)   10+/10 when throwing up. =3-4/10 in between times    MEDICATIONS:   Taking medication(s) as prescribed? Yes oxycodone  Taking over the counter medication(s?) Yes tylenol  Any barriers to taking medication(s) as prescribed?  No  Medication(s) improving/managing symptoms?  No, removed patch as instructed. Does not have more.    PREFERRED PHARMACY [facility] PER EMR    Allergies:   Allergies   Allergen Reactions     Seasonal Allergies        ASSESSMENT      Post op throwing up. Previously controlled by scopolamine patch.  Needs additional medication / patches.  Contacted clinic, spoke with Yobany.  He will forward information to Dr. Nam's clinic nurse.    RECOMMENDATION/PLAN                                                       RECOMMENDED DISPOSITION:  Continue as she has been.  Await call back from clinic.  Call back mid afternoon if hasn't heard by then.  After hours on call switchboard number provided if problems arise at night / tomorrow on the Holiday.  Will comply with recommendation: Yes    If further questions/concerns or if symptoms do not improve, worsen or new symptoms develop, call your PCP or [phone] to talk with the Resident on call, as soon as possible.    Guideline used: Postoperative problems page 458  Telephone Triage Protocols for Nurses, Fifth Edition, Gissel Cruz RN

## 2018-12-31 NOTE — TELEPHONE ENCOUNTER
Patient called reporting red-flag symptom: Throwing up after removal of patch from SX on 12/28/18, connected call to Triage Nurse Mis    Per the [facility] Red-Flag symptom list, patient was: refused to dial 911.  Will send message to clinic team as an FYI.

## 2019-01-08 ENCOUNTER — OFFICE VISIT (OUTPATIENT)
Dept: OTOLARYNGOLOGY | Facility: CLINIC | Age: 44
End: 2019-01-08
Payer: COMMERCIAL

## 2019-01-08 DIAGNOSIS — J38.7 LARYNGEAL HYPERFUNCTION: Primary | ICD-10-CM

## 2019-01-08 NOTE — LETTER
1/8/2019       RE: Radha Camargo  [address]  [place] [address]     Dear Colleague,    Thank you for referring your patient, Radha Camargo, to the [facility] EAR [facility] at [facility]. Please see a copy of my visit note below.      HISTORY OF PRESENT ILLNESS: Radha Camargo is a 43 year old female with a history of a muscle tension dysphonia myofascial pain which for which we had seen her in the past.  Then she had recurrent tonsillitis with tonsilloliths.  On 12/28/2018 she underwent a bilateral tonsillectomy.  She did have postoperative soreness as expected.  There was no bleeding.  She notes that it is still difficult to swallow solid food but is doing well with Tylenol and only using extra medication at night to go to sleep.  She still has her original prescription.  She still is fatigued and is unable to take a regular diet which is also expected at this time.    Last 2 Scores for Patient-Answered VHI Questionnaire  VHI Total Score 6/14/2018   VHI Total Score 21       Last 2 Scores for Patient-Answered CSI Questionnaire  CSI Total Score 6/14/2018   CSI Total Score 0         Last 2 Scores for Patient-Answered EAT Questionnaire  EAT Total Score 6/14/2018   EAT Total Score 11       PAST MEDICAL HISTORY: Menorrhagia, weight gain and hoarseness      PAST SURGICAL HISTORY:   Past Surgical History:   Procedure Laterality Date     TONSILLECTOMY Bilateral 12/28/2018    Procedure: Bilateral Tonsillectomy;  Surgeon: Ameya Nam MD;  Location:  OR       FAMILY HISTORY:   Family History   Problem Relation Age of Onset     Breast Cancer Mother 61     Other Cancer Mother 30        ovarian     Hypertension Mother      Anxiety Disorder Mother      Depression Mother      Obesity Mother      Thyroid Disease Maternal Grandmother      Obesity Maternal Grandmother      Coronary Artery Disease Paternal Grandmother      Diabetes Paternal Grandfather      Unknown/Adopted  Paternal Grandfather      Colon Cancer Brother      Other Cancer Sister      Thyroid Disease Cousin      Obesity Cousin      Breast Cancer Cousin      Breast Cancer Cousin        SOCIAL HISTORY:   Social History     Tobacco Use     Smoking status: Never Smoker     Smokeless tobacco: Never Used   Substance Use Topics     Alcohol use: No       REVIEW OF SYSTEMS: Ten point review of systems was performed and is negative except for:   [facility] ROS 1/8/2019   Constitutional Weight loss, Appetite change, Problems with sleep   Neurology Headache   Ears, Nose, Throat Ear pain, Sore throat, Trouble swallowing, Hoarseness   Gastrointestinal/Genitourinary -   Musculoskeletal Neck pain   Endocrine -        ALLERGIES: Seasonal allergies    MEDICATIONS:   Current Outpatient Medications   Medication Sig Dispense Refill     Cholecalciferol (VITAMIN D) 2000 units tablet Take 2,000 Units by mouth daily 100 tablet 3     ferrous sulfate (IRON) 325 (65 FE) MG tablet Take 1 tablet (325 mg) by mouth daily (with breakfast) 30 tablet 2     IBUPROFEN PO Take 400 mg by mouth once       levonorgestrel (MIRENA) 20 MCG/24HR IUD 1 each (20 mcg) by Intrauterine route continuous       ondansetron (ZOFRAN) 4 MG tablet Take 1 tablet (4 mg) by mouth every 6 hours as needed for nausea or vomiting 5 tablet 0         PHYSICAL EXAMINATION:  She  is awake, alert and in no apparent distress.    Her tympanic membranes are clear and intact bilaterally. External auditory canals are clear.  Nasal exam shows a mild septal deviation without obstruction.  Examination of the oral cavity shows her to be status post a tonsillectomy.  Both tonsillar pillars are intact.  There is eschar in the tonsillar fossa is bilaterally.  The remainder of the oropharynx is clear.  Her neck is supple without significant adenopathy.  Pulse is regular.  Upper airway is clear.  Cranial nerves II-XII are grossly intact.            IMPRESSION/PLAN: She is doing as expected following a  bilateral tonsillectomy.  She has had no early bleeding complications.  She is still fatigued and sore at 10 days out.  After discussion I did give her a note to return to work on January 21, 2019 which would be 3 weeks after surgery.  All questions were answered and I will have her follow-up on an as-needed basis.    Again, thank you for allowing me to participate in the care of your patient.      Sincerely,    Ameya Nam MD

## 2019-01-08 NOTE — LETTER
[facility] E[facility]  [address]  [address]  [place] [address]          January 8, 2019    RE:  Radha Camargo                                                                                                                                                       [address] [place] [address]            To whom it may concern:    Radha Camargo is under my professional care for Chronic tonsillitis and had a tonsillectomy on December 28, 2018.  She  may return on January 21,2019.    Sincerely,        Ameya Nam MD

## 2019-01-08 NOTE — PROGRESS NOTES
HISTORY OF PRESENT ILLNESS: Radha Camargo is a 43 year old female with a history of a muscle tension dysphonia myofascial pain which for which we had seen her in the past.  Then she had recurrent tonsillitis with tonsilloliths.  On 12/28/2018 she underwent a bilateral tonsillectomy.  She did have postoperative soreness as expected.  There was no bleeding.  She notes that it is still difficult to swallow solid food but is doing well with Tylenol and only using extra medication at night to go to sleep.  She still has her original prescription.  She still is fatigued and is unable to take a regular diet which is also expected at this time.    Last 2 Scores for Patient-Answered VHI Questionnaire  VHI Total Score 6/14/2018   VHI Total Score 21       Last 2 Scores for Patient-Answered CSI Questionnaire  CSI Total Score 6/14/2018   CSI Total Score 0         Last 2 Scores for Patient-Answered EAT Questionnaire  EAT Total Score 6/14/2018   EAT Total Score 11       PAST MEDICAL HISTORY: Menorrhagia, weight gain and hoarseness      PAST SURGICAL HISTORY:   Past Surgical History:   Procedure Laterality Date     TONSILLECTOMY Bilateral 12/28/2018    Procedure: Bilateral Tonsillectomy;  Surgeon: Ameya Nam MD;  Location:  OR       FAMILY HISTORY:   Family History   Problem Relation Age of Onset     Breast Cancer Mother 61     Other Cancer Mother 30        ovarian     Hypertension Mother      Anxiety Disorder Mother      Depression Mother      Obesity Mother      Thyroid Disease Maternal Grandmother      Obesity Maternal Grandmother      Coronary Artery Disease Paternal Grandmother      Diabetes Paternal Grandfather      Unknown/Adopted Paternal Grandfather      Colon Cancer Brother      Other Cancer Sister      Thyroid Disease Cousin      Obesity Cousin      Breast Cancer Cousin      Breast Cancer Cousin        SOCIAL HISTORY:   Social History     Tobacco Use     Smoking status: Never Smoker     Smokeless  tobacco: Never Used   Substance Use Topics     Alcohol use: No       REVIEW OF SYSTEMS: Ten point review of systems was performed and is negative except for:   [facility] ROS 1/8/2019   Constitutional Weight loss, Appetite change, Problems with sleep   Neurology Headache   Ears, Nose, Throat Ear pain, Sore throat, Trouble swallowing, Hoarseness   Gastrointestinal/Genitourinary -   Musculoskeletal Neck pain   Endocrine -        ALLERGIES: Seasonal allergies    MEDICATIONS:   Current Outpatient Medications   Medication Sig Dispense Refill     Cholecalciferol (VITAMIN D) 2000 units tablet Take 2,000 Units by mouth daily 100 tablet 3     ferrous sulfate (IRON) 325 (65 FE) MG tablet Take 1 tablet (325 mg) by mouth daily (with breakfast) 30 tablet 2     IBUPROFEN PO Take 400 mg by mouth once       levonorgestrel (MIRENA) 20 MCG/24HR IUD 1 each (20 mcg) by Intrauterine route continuous       ondansetron (ZOFRAN) 4 MG tablet Take 1 tablet (4 mg) by mouth every 6 hours as needed for nausea or vomiting 5 tablet 0         PHYSICAL EXAMINATION:  She  is awake, alert and in no apparent distress.    Her tympanic membranes are clear and intact bilaterally. External auditory canals are clear.  Nasal exam shows a mild septal deviation without obstruction.  Examination of the oral cavity shows her to be status post a tonsillectomy.  Both tonsillar pillars are intact.  There is eschar in the tonsillar fossa is bilaterally.  The remainder of the oropharynx is clear.  Her neck is supple without significant adenopathy.  Pulse is regular.  Upper airway is clear.  Cranial nerves II-XII are grossly intact.            IMPRESSION/PLAN: She is doing as expected following a bilateral tonsillectomy.  She has had no early bleeding complications.  She is still fatigued and sore at 10 days out.  After discussion I did give her a note to return to work on January 21, 2019 which would be 3 weeks after surgery.  All questions were answered and I will have  her follow-up on an as-needed basis.

## 2019-01-09 ENCOUNTER — TELEPHONE (OUTPATIENT)
Dept: OTOLARYNGOLOGY | Facility: CLINIC | Age: 44
End: 2019-01-09

## 2019-01-09 NOTE — TELEPHONE ENCOUNTER
Pt called as she saw Dr. Nam yesterday; however, she forgot to bring the short term disability paperwork that he needs to complete/sign.  Therefore, she is faxing it to [phone], Attn: Dr. Nam, today.  Please follow up with pt.  Thank you!

## 2019-11-08 ENCOUNTER — OFFICE VISIT (OUTPATIENT)
Dept: FAMILY MEDICINE | Facility: CLINIC | Age: 44
End: 2019-11-08
Payer: COMMERCIAL

## 2019-11-08 VITALS
BODY MASS INDEX: 28.3 KG/M2 | HEIGHT: 62 IN | TEMPERATURE: 98.8 F | HEART RATE: 66 BPM | WEIGHT: 153.8 LBS | SYSTOLIC BLOOD PRESSURE: 124 MMHG | DIASTOLIC BLOOD PRESSURE: 84 MMHG | OXYGEN SATURATION: 97 %

## 2019-11-08 DIAGNOSIS — Z80.3 FAMILY HISTORY OF MALIGNANT NEOPLASM OF BREAST: ICD-10-CM

## 2019-11-08 DIAGNOSIS — F32.1 CURRENT MODERATE EPISODE OF MAJOR DEPRESSIVE DISORDER WITHOUT PRIOR EPISODE (H): ICD-10-CM

## 2019-11-08 DIAGNOSIS — Z12.31 ENCOUNTER FOR SCREENING MAMMOGRAM FOR BREAST CANCER: ICD-10-CM

## 2019-11-08 DIAGNOSIS — Z00.00 ROUTINE GENERAL MEDICAL EXAMINATION AT A HEALTH CARE FACILITY: Primary | ICD-10-CM

## 2019-11-08 LAB
CHOLEST SERPL-MCNC: 184.7 MG/DL (ref 0–200)
CHOLEST/HDLC SERPL: 3.2 {RATIO} (ref 0–5)
GLUCOSE SERPL-MCNC: 105.9 MG'DL (ref 70–99)
HDLC SERPL-MCNC: 58.5 MG/DL
LDLC SERPL CALC-MCNC: 115 MG/DL (ref 0–129)
TRIGL SERPL-MCNC: 54.1 MG/DL (ref 0–150)
VLDL CHOLESTEROL: 10.8 MG/DL (ref 7–32)

## 2019-11-08 RX ORDER — ESCITALOPRAM OXALATE 10 MG/1
10 TABLET ORAL DAILY
Qty: 30 TABLET | Refills: 0 | Status: SHIPPED | OUTPATIENT
Start: 2019-11-08 | End: 2019-12-06

## 2019-11-08 ASSESSMENT — ANXIETY QUESTIONNAIRES
5. BEING SO RESTLESS THAT IT IS HARD TO SIT STILL: SEVERAL DAYS
GAD7 TOTAL SCORE: 14
3. WORRYING TOO MUCH ABOUT DIFFERENT THINGS: MORE THAN HALF THE DAYS
6. BECOMING EASILY ANNOYED OR IRRITABLE: NEARLY EVERY DAY
2. NOT BEING ABLE TO STOP OR CONTROL WORRYING: SEVERAL DAYS
7. FEELING AFRAID AS IF SOMETHING AWFUL MIGHT HAPPEN: NEARLY EVERY DAY
1. FEELING NERVOUS, ANXIOUS, OR ON EDGE: SEVERAL DAYS
IF YOU CHECKED OFF ANY PROBLEMS ON THIS QUESTIONNAIRE, HOW DIFFICULT HAVE THESE PROBLEMS MADE IT FOR YOU TO DO YOUR WORK, TAKE CARE OF THINGS AT HOME, OR GET ALONG WITH OTHER PEOPLE: VERY DIFFICULT

## 2019-11-08 ASSESSMENT — PATIENT HEALTH QUESTIONNAIRE - PHQ9
SUM OF ALL RESPONSES TO PHQ QUESTIONS 1-9: 16
5. POOR APPETITE OR OVEREATING: NEARLY EVERY DAY

## 2019-11-08 ASSESSMENT — MIFFLIN-ST. JEOR: SCORE: 1314.85

## 2019-11-08 NOTE — PROGRESS NOTES
Preceptor Attestation:   Patient seen, evaluated and discussed with the resident. I have verified the content of the note, which accurately reflects my assessment of the patient and the plan of care.   Supervising Physician:  Brendon Villarreal MD.

## 2019-11-08 NOTE — Clinical Note
"CPE are important times to do Med Rec (and this patient's list is easy). If you did it, you did not click the \"Marked as Reviewed\" button.P"

## 2019-11-08 NOTE — PATIENT INSTRUCTIONS
Schedule with Dr. Samaria Lai on Nov. 22, 2019 at 9:40 am. Look forward to seeing you soon!      Preventive Health Recommendations  Female Ages 40 to 49    Yearly exam:     See your health care provider every year in order to  1. Review health changes.   2. Discuss preventive care.    3. Review your medicines if your doctor prescribed any.      Get a Pap test every three years (unless you have an abnormal result and your provider advises testing more often).      If you get Pap tests with HPV test, you only need to test every 5 years, unless you have an abnormal result. You do not need a Pap test if your uterus was removed (hysterectomy) and you have not had cancer.      You should be tested each year for STDs (sexually transmitted diseases), if you're at risk.     Ask your doctor if you should have a mammogram.      Have a colonoscopy (test for colon cancer) if someone in your family has had colon cancer or polyps before age 50.       Have a cholesterol test every 5 years.       Have a diabetes test (fasting glucose) after age 45. If you are at risk for diabetes, you should have this test every 3 years.    Shots: Get a flu shot each year. Get a tetanus shot every 10 years.     Nutrition:     Eat at least 5 servings of fruits and vegetables each day.    Eat whole-grain bread, whole-wheat pasta and brown rice instead of white grains and rice.    Get adequate Calcium and Vitamin D.      Lifestyle    Exercise at least 150 minutes a week (an average of 30 minutes a day, 5 days a week). This will help you control your weight and prevent disease.    Limit alcohol to one drink per day.    No smoking.     Wear sunscreen to prevent skin cancer.    See your dentist every six months for an exam and cleaning.

## 2019-11-08 NOTE — PROGRESS NOTES
Female Physical Note          HPI         Concerns today:     Mood Symptoms     Concerns: depression, mood changes    How long have you been feeling this way? Weeks to months      Description:   Depressed Mood?: YES   Anxious Mood?:  YES     Accompanying Signs & Symptoms:  Still participating in activities that you used to enjoy?: Yes  Fatigue:  YES   Irritability:  YES   Difficulty concentrating:  YES   Changes in appetite: No   Problems with sleep:  YES   ++++++++++++++++++++++++++++++++      Substance Use: No        Alcohol Use:rare                Other drug use:  Never Used    Social Support                     Who do you turn to for support? family    Resources                What do you do to manage stress? Grin and bear it        Exercise: no      History  Has there been a time in your life when you needed much less sleep than usual? No   Heart racing/beating fast : No   Thoughts of hurting yourself or others: No   Previous treatment Antidepressants - none                                  Therapy: No  Today's PHQ-9 Score:   PHQ-9 SCORE 11/8/2019   PHQ-9 Total Score 16          ISIDRO Score:  ISIDRO-7 SCORE 11/8/2019   Total Score 14       Patient Active Problem List   Diagnosis     Irregular menses     Metrorrhagia     Hair loss     Abnormal weight gain     Fatigue, unspecified type     IUD (intrauterine device) in place     Laryngeal hyperfunction     Dysphonia     Encounter for preventative adult health care examination     Tonsillitis       History reviewed. No pertinent past medical history.       Family History   Problem Relation Age of Onset     Breast Cancer Mother 61     Other Cancer Mother 30        ovarian     Hypertension Mother      Anxiety Disorder Mother      Depression Mother      Obesity Mother      Thyroid Disease Maternal Grandmother      Obesity Maternal Grandmother      Coronary Artery Disease Paternal Grandmother      Diabetes Paternal Grandfather      Unknown/Adopted Paternal Grandfather       Colon Cancer Brother      Other Cancer Sister      Breast Cancer Sister 35     Thyroid Disease Cousin      Obesity Cousin      Breast Cancer Cousin      Breast Cancer Cousin               Review of Systems:     Review of Systems:  CONSTITUTIONAL: NEGATIVE for fever, chills, change in weight  INTEGUMENTARY/SKIN: NEGATIVE for worrisome rashes, moles or lesions  EYES: NEGATIVE for vision changes or irritation  ENT/MOUTH: NEGATIVE for ear, mouth and throat problems  RESP: NEGATIVE for significant cough or SOB  BREAST: NEGATIVE for masses, tenderness or discharge  CV: NEGATIVE for chest pain, palpitations or peripheral edema  GI: NEGATIVE for nausea, abdominal pain, heartburn, or change in bowel habits  : NEGATIVE for frequency, dysuria, or hematuria  MUSCULOSKELETAL: NEGATIVE for significant arthralgias or myalgia  NEURO: NEGATIVE for weakness, dizziness or paresthesias  ENDOCRINE: NEGATIVE for temperature intolerance, skin/hair changes  HEME/ALLERGY: NEGATIVE for bleeding problems  PSYCHIATRIC: positive as above  Sleep:   Do you snore most or the night (as reported by a family member)? No  Do you feel sleepy or extremely tired during most of the day? No           Social History     Social History     Socioeconomic History     Marital status:      Spouse name: Not on file     Number of children: Not on file     Years of education: Not on file     Highest education level: Not on file   Occupational History     Not on file   Social Needs     Financial resource strain: Not on file     Food insecurity:     Worry: Not on file     Inability: Not on file     Transportation needs:     Medical: Not on file     Non-medical: Not on file   Tobacco Use     Smoking status: Never Smoker     Smokeless tobacco: Never Used   Substance and Sexual Activity     Alcohol use: No     Drug use: No     Sexual activity: Yes   Lifestyle     Physical activity:     Days per week: Not on file     Minutes per session: Not on file      "Stress: Not on file   Relationships     Social connections:     Talks on phone: Not on file     Gets together: Not on file     Attends [employer] service: Not on file     Active member of club or organization: Not on file     Attends meetings of clubs or organizations: Not on file     Relationship status: Not on file     Intimate partner violence:     Fear of current or ex partner: Not on file     Emotionally abused: Not on file     Physically abused: Not on file     Forced sexual activity: Not on file   Other Topics Concern     Not on file   Social History Narrative     Not on file       Sexual Health     Sexual concerns: No   STI History: Neg  Pregnancy History:   LMP Patient's last menstrual period was 2019 (approximate).   Last Pap Smear Date:   Lab Results   Component Value Date    PAP NIL 2018     Abnormal Pap History: None    Recommended Screening     Breast CA Screening (>39 yo or 10 y before 1st degree relative diagnosis): Recommended and patient accepted testing.         Physical Exam:     Vitals: /84   Pulse 66   Temp 98.8  F (37.1  C)   Ht 1.581 m (5' 2.25\")   Wt 69.8 kg (153 lb 12.8 oz)   LMP 2019 (Approximate)   SpO2 97%   Breastfeeding? No   BMI 27.90 kg/m    BMI= Body mass index is 27.9 kg/m .   GENERAL: healthy, alert and no distress  EYES: Eyes grossly normal to inspection, extraocular movements - intact, and PERRL  HENT: ear canals- normal; TMs- normal; Nose- normal; Mouth- no ulcers, no lesions  NECK: no tenderness, no adenopathy, no asymmetry, no masses, no stiffness; thyroid- normal to palpation  RESP: lungs clear to auscultation - no rales, no rhonchi, no wheezes  CV: regular rates and rhythm, normal S1 S2, no S3 or S4 and no murmur, no click or rub -  ABDOMEN: soft, no tenderness, no  hepatosplenomegaly, no masses, normal bowel sounds  MS: extremities- no gross deformities noted, no edema  SKIN: no suspicious lesions, no rashes  NEURO: strength and tone- " normal, sensory exam- grossly normal, mentation- intact, speech- normal, reflexes- symmetric  BACK: no CVA tenderness, no paralumbar tenderness  PSYCH: Alert and oriented times 3; speech- coherent , normal rate and volume; able to articulate logical thoughts, able to abstract reason, no tangential thoughts, no hallucinations or delusions, affect- normal  LYMPHATICS: ant. cervical- normal, post. cervical- normal, axillary- normal, supraclavicular- normal    Assessment and Plan      Radha was seen today for physical.    Diagnoses and all orders for this visit:    Routine general medical examination at a health care facility  -     Lipid Weems (G[facility])  -     Glucose ([facility])  Patient presenting for routine annual testing her.  Requesting  lipids and glucose.  Not fasting today, but no concerns with current glucose.  HDL slightly low, but otherwise reassuring laboratories.  Has IUD for contraception, annual follow-up for preventative care.  Patient declined flu shot today.    Encounter for screening mammogram for breast cancer  -     Screening Mammogram Digital Bilateral; Future  Patient due for annual screening for breast cancer, does have extensive family history of breast cancer and is on annual screening schedule.    Current moderate episode of major depressive disorder without prior episode (H)  -     escitalopram (LEXAPRO) 10 MG tablet; Take 1 tablet (10 mg) by mouth daily  -     BEHAVIORAL HEALTH REFERRAL (Radha's interal and external)  Patient with new diagnosis of depression today.  Depressed mood has been present for a few months and she has been dealing with it without intervention.  Given elevated PHQ will start SSRI today. I've explained to her that drugs of the SSRI class can have side effects such as weight gain, sexual dysfunction, insomnia, headache, nausea. These medications are generally effective at alleviating symptoms of anxiety and/or depression. Let me know if significant side effects do  occur.  Also referred to behavioral health for counseling.  [facility] behavioral health psychologist had time and met with patient.  Will follow-up with me in 2 weeks.      Family history of malignant neoplasm of breast  -     BREAST CENTER REFERRAL - INTERNAL  Extensive family history of breast cancer, does have first-degree relative with breast cancer diagnosis at age 36, and another first-degree relative in 50s.  Will refer to breast center for counseling and potential BRCA testing.    Options for treatment and follow-up care were reviewed with the patient . Radha Camargo and/or guardian engaged in the decision making process and verbalized understanding of the options discussed and agreed with the final plan.    Junior Live DO, MA  Family Medicine PGY-3  [facility], [facility]   Pager: [phone]

## 2019-11-08 NOTE — PROGRESS NOTES
"Primary Care Behavioral Health Consult Note    Meeting lasted: 15 minutes  Others present: patient    Identifying Information and Presenting Problem:    Dr. Live requested behavioral health consultation for this patient regarding symptoms of depression and anxiety.  The patient is a 44 year old American individual that agreed to be seen by behavioral health today.    Topics Discussed/Interventions Provided:       This provider met with Radha today to discuss the role of the behavioral health service in the clinic and generally educate her on the psychotherapy services available at [facility]. Radha was given the opportunity to ask questions and state her goals/expectations for initiating services with the behavioral health team. She is interested in establishing psychotherapy with this provider and a referral to behavioral health has been initiated to set this up. We identified that Friday November 22, 2019 at 9:40 am worked well for her and she will schedule this at the  following today's appointment.     Radha reported she has been struggling with depression symptoms for the past year. She has been trying to \"grin and bear it,\" but noted that she felt it was time to get help. She reported her mother had depression most of her life and was recently diagnosed with dementia. This made her think she does not want to \"end up like her\" and worried for her children watching their own mother be depressed. Validated and provided empathic support. Praised her for the strength needed to take this step. Identified some anxiety interfering with sleep (\"it's crappy\"). Very briefly discussed sleep hygiene strategies, especially identifying a relaxation or wind down routine. Radha noted she had not thought of this and will try to start reading before bed.     Assessment:     Mental Status: Radha appeared generally alert and oriented. Dress was casual and appropriate to the weather and occasion. Grooming and hygiene were " good. Eye contact was fair. Speech was of normal volume and rate and was clear, coherent, and relevant. Mood was down with flat affect. Thought processes were relevant, logical and goal-directed. Thought content was WNL with no evidence of psychotic or paranoid features. No evidence of SI/HI or self-harm, intent, or plans. Memory appeared grossly intact. Insight and judgment appeared fair and patient exhibited appropriate impulse control during the appointment.     Diagnostic Considerations:      A complete diagnostic assessment was not performed at today's visit. Patient's reported symptoms appeared congruent with a major depressive disorder episode.     Plan:       referral has been initiated. Instructed patient to schedule with Dr. Lai on November 22, 2019 at 9:40 am.     Dr. Live started patient on Lexapro. Patient will schedule follow-up with Dr. Live in 2 weeks as well.

## 2019-11-09 ASSESSMENT — ANXIETY QUESTIONNAIRES: GAD7 TOTAL SCORE: 14

## 2019-11-11 ENCOUNTER — TELEPHONE (OUTPATIENT)
Dept: PSYCHOLOGY | Facility: CLINIC | Age: 44
End: 2019-11-11

## 2019-11-11 ENCOUNTER — DOCUMENTATION ONLY (OUTPATIENT)
Dept: FAMILY MEDICINE | Facility: CLINIC | Age: 44
End: 2019-11-11

## 2019-11-11 NOTE — TELEPHONE ENCOUNTER
Dr. Lai spoke to patient when she was here and they agreed upon this time -Friday November 22, 2019 at 9:40. Dr. Lai asked the patient to stop at the  to schedule this on their way out.  Either patient did not stop or the  may have told her they couldn't schedule it since they aren't scheduling first appointments.  Kylah - could you please put this appt in Dr. Lai's schedule and call the patient to confirm that the appt has been scheduled for her? Thank you!    Mental Health Referral:  Please schedule with Dr. Lai      Please let patient know this is for primary care behavioral health services.  Therapists at our clinic are able to see patients for 8-12 sessions. If further assistance is needed, they will help the patient connect with ongoing services in the community.    If you are unable to reach the patient after two phone attempts, please send a letter and close the encounter.      Thank you!

## 2019-11-11 NOTE — PROGRESS NOTES
Form has been completed by provider.     Form sent out via: Fax to [employer] at Fax Number: [phone]  Patient informed: Yes  Output date: November 11, 2019    Kylah Stephenson      **Please close the encounter**

## 2019-11-11 NOTE — PROGRESS NOTES
Preceptor Attestation:  I have reviewed and agree with the behavioral health fellow's documentation for this visit.  I did not see the patient.  Supervising Clinical Psychologist:  Daniela Gleason PSYD LP

## 2019-11-22 ENCOUNTER — ANCILLARY PROCEDURE (OUTPATIENT)
Dept: MAMMOGRAPHY | Facility: CLINIC | Age: 44
End: 2019-11-22
Attending: FAMILY MEDICINE
Payer: COMMERCIAL

## 2019-11-22 ENCOUNTER — OFFICE VISIT (OUTPATIENT)
Dept: PSYCHOLOGY | Facility: CLINIC | Age: 44
End: 2019-11-22
Payer: COMMERCIAL

## 2019-11-22 DIAGNOSIS — F32.1 CURRENT MODERATE EPISODE OF MAJOR DEPRESSIVE DISORDER WITHOUT PRIOR EPISODE (H): Primary | ICD-10-CM

## 2019-11-22 DIAGNOSIS — Z12.31 ENCOUNTER FOR SCREENING MAMMOGRAM FOR BREAST CANCER: ICD-10-CM

## 2019-11-22 ASSESSMENT — ANXIETY QUESTIONNAIRES
5. BEING SO RESTLESS THAT IT IS HARD TO SIT STILL: NOT AT ALL
7. FEELING AFRAID AS IF SOMETHING AWFUL MIGHT HAPPEN: SEVERAL DAYS
1. FEELING NERVOUS, ANXIOUS, OR ON EDGE: SEVERAL DAYS
IF YOU CHECKED OFF ANY PROBLEMS ON THIS QUESTIONNAIRE, HOW DIFFICULT HAVE THESE PROBLEMS MADE IT FOR YOU TO DO YOUR WORK, TAKE CARE OF THINGS AT HOME, OR GET ALONG WITH OTHER PEOPLE: SOMEWHAT DIFFICULT
GAD7 TOTAL SCORE: 6
3. WORRYING TOO MUCH ABOUT DIFFERENT THINGS: SEVERAL DAYS
2. NOT BEING ABLE TO STOP OR CONTROL WORRYING: SEVERAL DAYS
6. BECOMING EASILY ANNOYED OR IRRITABLE: SEVERAL DAYS
4. TROUBLE RELAXING: SEVERAL DAYS

## 2019-11-22 ASSESSMENT — PATIENT HEALTH QUESTIONNAIRE - PHQ9: SUM OF ALL RESPONSES TO PHQ QUESTIONS 1-9: 10
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
## 2019-11-22 NOTE — Clinical Note
Hello, thank you for the referral. I met with the patient today and scheduled a follow-up appointment. Please let me know if you have any further questions. Thanks!

## 2019-11-23 ASSESSMENT — ANXIETY QUESTIONNAIRES: GAD7 TOTAL SCORE: 6

## 2019-12-06 ENCOUNTER — OFFICE VISIT (OUTPATIENT)
Dept: FAMILY MEDICINE | Facility: CLINIC | Age: 44
End: 2019-12-06
Payer: COMMERCIAL

## 2019-12-06 VITALS
OXYGEN SATURATION: 98 % | BODY MASS INDEX: 27.14 KG/M2 | DIASTOLIC BLOOD PRESSURE: 84 MMHG | SYSTOLIC BLOOD PRESSURE: 123 MMHG | WEIGHT: 153.2 LBS | TEMPERATURE: 98 F | HEART RATE: 63 BPM | HEIGHT: 63 IN

## 2019-12-06 DIAGNOSIS — F32.4 MAJOR DEPRESSIVE DISORDER WITH SINGLE EPISODE, IN PARTIAL REMISSION (H): Primary | ICD-10-CM

## 2019-12-06 PROBLEM — J03.90 TONSILLITIS: Status: RESOLVED | Noted: 2018-11-02 | Resolved: 2019-12-06

## 2019-12-06 RX ORDER — ESCITALOPRAM OXALATE 10 MG/1
10 TABLET ORAL DAILY
Qty: 90 TABLET | Refills: 0 | Status: SHIPPED | OUTPATIENT
Start: 2019-12-06 | End: 2020-03-06

## 2019-12-06 ASSESSMENT — PATIENT HEALTH QUESTIONNAIRE - PHQ9
SUM OF ALL RESPONSES TO PHQ QUESTIONS 1-9: 3
5. POOR APPETITE OR OVEREATING: NOT AT ALL

## 2019-12-06 ASSESSMENT — ANXIETY QUESTIONNAIRES
1. FEELING NERVOUS, ANXIOUS, OR ON EDGE: NOT AT ALL
6. BECOMING EASILY ANNOYED OR IRRITABLE: SEVERAL DAYS
3. WORRYING TOO MUCH ABOUT DIFFERENT THINGS: NOT AT ALL
GAD7 TOTAL SCORE: 1
5. BEING SO RESTLESS THAT IT IS HARD TO SIT STILL: NOT AT ALL
7. FEELING AFRAID AS IF SOMETHING AWFUL MIGHT HAPPEN: NOT AT ALL
2. NOT BEING ABLE TO STOP OR CONTROL WORRYING: NOT AT ALL

## 2019-12-06 ASSESSMENT — MIFFLIN-ST. JEOR: SCORE: 1316.1

## 2019-12-06 NOTE — PROGRESS NOTES
Preceptor Attestation:   Patient seen and discussed with the resident. Assessment and plan reviewed with resident and agreed upon.   Supervising Physician:  DO [facility]

## 2019-12-07 ASSESSMENT — ANXIETY QUESTIONNAIRES: GAD7 TOTAL SCORE: 1

## 2019-12-13 ENCOUNTER — TELEPHONE (OUTPATIENT)
Dept: PSYCHOLOGY | Facility: CLINIC | Age: 44
End: 2019-12-13

## 2020-03-06 ENCOUNTER — OFFICE VISIT (OUTPATIENT)
Dept: FAMILY MEDICINE | Facility: CLINIC | Age: 45
End: 2020-03-06
Payer: COMMERCIAL

## 2020-03-06 VITALS
OXYGEN SATURATION: 100 % | SYSTOLIC BLOOD PRESSURE: 116 MMHG | BODY MASS INDEX: 28.76 KG/M2 | WEIGHT: 159.8 LBS | DIASTOLIC BLOOD PRESSURE: 76 MMHG | TEMPERATURE: 98.4 F | RESPIRATION RATE: 16 BRPM | HEART RATE: 60 BPM

## 2020-03-06 DIAGNOSIS — F32.5 MAJOR DEPRESSIVE DISORDER WITH SINGLE EPISODE, IN FULL REMISSION (H): Primary | ICD-10-CM

## 2020-03-06 DIAGNOSIS — Z80.3 FAMILY HISTORY OF MALIGNANT NEOPLASM OF BREAST: ICD-10-CM

## 2020-03-06 DIAGNOSIS — N92.6 IRREGULAR MENSES: ICD-10-CM

## 2020-03-06 DIAGNOSIS — N64.4 BREAST TENDERNESS: ICD-10-CM

## 2020-03-06 DIAGNOSIS — Z97.5 IUD (INTRAUTERINE DEVICE) IN PLACE: ICD-10-CM

## 2020-03-06 ASSESSMENT — ENCOUNTER SYMPTOMS
SLEEP DISTURBANCE: 0
SHORTNESS OF BREATH: 0
FEVER: 0
CHILLS: 0

## 2020-03-06 ASSESSMENT — PATIENT HEALTH QUESTIONNAIRE - PHQ9: SUM OF ALL RESPONSES TO PHQ QUESTIONS 1-9: 1

## 2020-03-06 ASSESSMENT — ANXIETY QUESTIONNAIRES
IF YOU CHECKED OFF ANY PROBLEMS ON THIS QUESTIONNAIRE, HOW DIFFICULT HAVE THESE PROBLEMS MADE IT FOR YOU TO DO YOUR WORK, TAKE CARE OF THINGS AT HOME, OR GET ALONG WITH OTHER PEOPLE: SOMEWHAT DIFFICULT

## 2020-03-06 NOTE — PROGRESS NOTES
HPI       Radha Camargo is a 44 year old  who presents for   Chief Complaint   Patient presents with     Recheck Medication       Depression/Anxiety follow up    Your mood since your last visit: Improved     Medication? Stopped Lexapro Feb 1. Since stopping meds, some breast tenderness. Home pregnancy tests negative.     Therapy? Still in therapy. Also sees  at [facility]. Meditation is helping     Exercise? Yoga 3-4 days a week.     What is going well? Setting boundaries.     Life Stressors: Mom with breast cancer and dementia    Other associated symptoms :None    How is your sleep? Good    New significant life event:No    Current substance use: None    Anxiety / Panic symptoms: No     Recent PHQ-9 Scores:     PHQ-9 SCORE 11/22/2019 12/6/2019 3/6/2020   PHQ-9 Total Score 10 3 1     Recent ISIDRO-7 Scores:      ISIDRO-7 SCORE 11/8/2019 11/22/2019 12/6/2019   Total Score 14 6 1       Some breast tenderness.  LMP mid Janurary. Home preg test negative 3/5/2020. This does not feel like pregnancy. Has mirena in place. S/p tubal.       Adherence   Medication side effects: no  How often is a medication missed? Never    +++++++    Problem, Medication and Allergy Lists were reviewed and updated if needed..    Patient is an established patient of this clinic..         Review of Systems:   Review of Systems   Constitutional: Negative for chills and fever.   Respiratory: Negative for shortness of breath.    Cardiovascular: Negative for chest pain.   Psychiatric/Behavioral: Negative for self-injury, sleep disturbance and suicidal ideas.            Physical Exam:     Vitals:    03/06/20 1259   BP: 116/76   Pulse: 60   Resp: 16   Temp: 98.4  F (36.9  C)   TempSrc: Oral   SpO2: 100%   Weight: 72.5 kg (159 lb 12.8 oz)     Body mass index is 28.76 kg/m .  Vitals were reviewed and were normal     Physical Exam   General: Alert and oriented, in no acute distress.  Skin: Warm and dry, no abnormalities noted.  Eyes: Extra-ocular muscles  intact, pupils equal and reactive.  ENT: Speech intact, nasal passages open, no hearing impairment noted.  CV: No cyanosis or pallor, warm and well perfused.  Respiratory: No respiratory distress, no accessory muscle use.  Neuro: Gait and station normal, comprehension intact. Gross and fine motor skills intact.   Psychiatric: Mood and affect appear normal.   Extremities: Warm, able to move all four extremities at will.    MENTAL STATUS EXAM  Appearance: appropriate  Attitude: cooperative  Behavior: normal  Eye Contact: normal  Speech: normal  Orientation: oriented to person, place, time and situation  Mood: normal  Affect: Congruent with mood  Thought Process: appropriate  Suicidal Ideation: reports no suicidal ideation  Hallucination: denies    Results:   No testing ordered today    Assessment and Plan        Radha was seen today for recheck medication.    Diagnoses and all orders for this visit:    Major depressive disorder with single episode, in full remission (H)  Patient with major depressive disorder, now in full remission.  PHQ stable.  We will continue to monitor for recurrence of symptoms.  Patient now off of SSRI for approximately 2 months.  Will continue following with counseling and spiritual support.      Breast tenderness  Family history of malignant neoplasm of breast  Irregular menses  IUD (intrauterine device) in place  Patient does have new breast tenderness for the past few weeks.  Notices that this is not focal but generalized.  No masses felt.  Does have breast cancer history in her family, but did have mammogram in November 2019 that was reassuring.  Very low likelihood that this is breast cancer related.  Patient had negative home pregnancy test last week, and also does have IUD that is in place.  She also has a history of tubal ligation, and IUD Mirena is in place for control of irregular menses.  Noted that tenderness is likely hormonal change, and patient may still have some cycle symptoms  without menses. Reviewed breast center referral.        Medications Discontinued During This Encounter   Medication Reason     escitalopram (LEXAPRO) 10 MG tablet        Options for treatment and follow-up care were reviewed with the patient. Radha Camargo  engaged in the decision making process and verbalized understanding of the options discussed and agreed with the final plan.    Junior Live DO, MA  Family Medicine PGY-3  [facility], [facility]

## 2020-03-12 ENCOUNTER — TELEPHONE (OUTPATIENT)
Dept: FAMILY MEDICINE | Facility: CLINIC | Age: 45
End: 2020-03-12

## 2020-03-12 NOTE — TELEPHONE ENCOUNTER
See ED note from 3/11/20   ++++++++++++++++++++++++++++  Patient was at work, pulling a cart and another cart crashed into her causing hand/wrist to be caught between the two carts. She was seen by workplace safety staff and sent to [facility] in [place] - xrays and care advice. No broken bones. Placed in a brace. Patient is icing and using ibuprofen which lessens the pain. There is bruising straight across the top of the wrist. Pain is in wrist and palm of hand.     Patient has paperwork, will see Dr. Mercer for Work Comp due to [facility] having no appointments. Encouraged patient to call back to clinic for any questions or worsening symptoms.     Salena Cochran RN  03/12/20  10:15 AM

## 2020-03-13 ENCOUNTER — OFFICE VISIT (OUTPATIENT)
Dept: FAMILY MEDICINE | Facility: CLINIC | Age: 45
End: 2020-03-13
Payer: OTHER MISCELLANEOUS

## 2020-03-13 VITALS
BODY MASS INDEX: 28.8 KG/M2 | DIASTOLIC BLOOD PRESSURE: 83 MMHG | TEMPERATURE: 98 F | HEIGHT: 62 IN | WEIGHT: 156.5 LBS | SYSTOLIC BLOOD PRESSURE: 116 MMHG | HEART RATE: 68 BPM | OXYGEN SATURATION: 99 %

## 2020-03-13 DIAGNOSIS — S69.91XD INJURY OF RIGHT WRIST, SUBSEQUENT ENCOUNTER: Primary | ICD-10-CM

## 2020-03-13 RX ORDER — IBUPROFEN 600 MG/1
600 TABLET, FILM COATED ORAL EVERY 6 HOURS PRN
Qty: 30 TABLET | Refills: 0 | Status: SHIPPED | OUTPATIENT
Start: 2020-03-13 | End: 2020-11-06

## 2020-03-13 ASSESSMENT — PAIN SCALES - GENERAL: PAINLEVEL: SEVERE PAIN (6)

## 2020-03-13 ASSESSMENT — MIFFLIN-ST. JEOR: SCORE: 1320.13

## 2020-03-13 NOTE — NURSING NOTE
"44 year old  Chief Complaint   Patient presents with     Work Comp     DOI 3/11/2020 right wrist injury / painful        Blood pressure 116/83, pulse 68, temperature 98  F (36.7  C), temperature source Oral, height 1.57 m (5' 1.81\"), weight 71 kg (156 lb 8 oz), SpO2 99 %, not currently breastfeeding. Body mass index is 28.8 kg/m .  Patient Active Problem List   Diagnosis     Irregular menses     Metrorrhagia     Hair loss     IUD (intrauterine device) in place     Laryngeal hyperfunction     Dysphonia     Major depressive disorder with single episode, in full remission (H)     Family history of malignant neoplasm of breast       Wt Readings from Last 2 Encounters:   03/13/20 71 kg (156 lb 8 oz)   03/06/20 72.5 kg (159 lb 12.8 oz)     BP Readings from Last 3 Encounters:   03/13/20 116/83   03/06/20 116/76   12/06/19 123/84         Current Outpatient Medications   Medication     Cholecalciferol (VITAMIN D) 2000 units tablet     ferrous sulfate (IRON) 325 (65 FE) MG tablet     IBUPROFEN PO     levonorgestrel (MIRENA) 20 MCG/24HR IUD     No current facility-administered medications for this visit.        Social History     Tobacco Use     Smoking status: Never Smoker     Smokeless tobacco: Never Used   Substance Use Topics     Alcohol use: No     Drug use: No       There are no preventive care reminders to display for this patient.    Lab Results   Component Value Date    PAP NIL 03/23/2018 March 13, 2020 9:06 AM    "

## 2020-03-13 NOTE — PROGRESS NOTES
SUBJECTIVE:   Radha Camargo is a 44 year old female who presents to clinic today to discuss the following problem(s).    Workman's Comp Injury Follow-Up  - seen on 3/11 in the ED (urgent care)  - had her R wrist smashed between 2 crates at work; see ED note from 3/11 for details  - patient is R handed  - previous XR negative for fracture  - patient has been treating with ibuprofen, a wrist brace, ice, ROM exercises  - initially seen by UC and told if pain persisted beyond 2-3 days she would need to be seen for further assessment  - today, patient reports persistent pain mid posterior wrist, exacerbated with extension, particularly with resistance. Also pain mid anterior wrist with flexion. Significantly decreased  strength. Per patient, pain with flexion and extension with resistance radiated up to elbow  - denies any decrease in sensation or circulation in distal extremity      ROS:   RESP: NEGATIVE for cough, hemoptysis, SOB/dyspnea and wheezing  CV: NEGATIVE for chest pain/chest pressure, dyspnea on exertion  MUSCULOSKELETAL: Wrist pain following recent trauma as noted above  INTEGUMENTARY/SKIN: Mild laceration as previously noted in ED note is healing well. NEGATIVE for new lesions and pruritis  NEURO: NEGATIVE for loss of sensation in distal RUE  PSYCHIATRIC: NEGATIVE    Today's PHQ-2:  PHQ-2 ( 1999 [employer]) 3/13/2020 12/6/2019   Q1: Little interest or pleasure in doing things 0 1   Q2: Feeling down, depressed or hopeless 0 0   PHQ-2 Score 0 1       Past Medical History:   Diagnosis Date     Tonsillitis 11/2/2018     Past Surgical History:   Procedure Laterality Date     TONSILLECTOMY Bilateral 12/28/2018    Procedure: Bilateral Tonsillectomy;  Surgeon: Ameya Nam MD;  Location:  OR     Family History   Problem Relation Age of Onset     Breast Cancer Mother 61     Other Cancer Mother 30        ovarian     Hypertension Mother      Anxiety Disorder Mother      Depression Mother      Obesity  "Mother      Thyroid Disease Maternal Grandmother      Obesity Maternal Grandmother      Coronary Artery Disease Paternal Grandmother      Diabetes Paternal Grandfather      Unknown/Adopted Paternal Grandfather      Colon Cancer Brother      Other Cancer Sister      Breast Cancer Sister 35     Thyroid Disease Cousin      Obesity Cousin      Breast Cancer Cousin      Breast Cancer Cousin      Social History     Tobacco Use     Smoking status: Never Smoker     Smokeless tobacco: Never Used   Substance Use Topics     Alcohol use: No     Drug use: No     Social History     Social History Narrative     Not on file       Current Outpatient Medications   Medication     Cholecalciferol (VITAMIN D) 2000 units tablet     ferrous sulfate (IRON) 325 (65 FE) MG tablet     IBUPROFEN PO     levonorgestrel (MIRENA) 20 MCG/24HR IUD     No current facility-administered medications for this visit.      I have reviewed the patient's past medical, surgical, family, and social history.     OBJECTIVE:   /83 (BP Location: Left arm, Patient Position: Sitting, Cuff Size: Adult Large)   Pulse 68   Temp 98  F (36.7  C) (Oral)   Ht 1.57 m (5' 1.81\")   Wt 71 kg (156 lb 8 oz)   SpO2 99%   BMI 28.80 kg/m      Constitutional: well-appearing, appears stated age  Cardiac: regular rate and rhythm, normal S1/S2, no murmur/rubs/gallops  Respiratory: lungs clear to auscultation bilaterally, normal work of breathing, no wheezes/crackles  MSK: R wrist  - minimal swelling, no gross malformation or skin discoloration  - tender to palpation at medial wrist, posterior more than anterior, no unusual warmth  - minimal  strength, resistance to carpal flexion or extension with antalgia  - distal circulation and light touch sensation grossly intact  Skin: previously noted abrasion at injury site is healing appropriately  Psych: affect is full and appropriate, speech is fluent and non-pressured    ASSESSMENT AND PLAN:     Radha was seen today for work " comp.    Diagnoses and all orders for this visit:    Injury of right wrist, subsequent encounter  -     [facility] REFERRAL; Future  -     ibuprofen (ADVIL/MOTRIN) 600 MG tablet; Take 1 tablet (600 mg) by mouth every 6 hours as needed for moderate pain    Suspect significant wrist sprain. Site of injury appears to be healing appropriately. Considered repeat XR but feel this is unnecessary based on patient report and physical exam findings. Will write for PT to start next week. Continue with ice, brace, movement exercises and high dose ibuprofen/tylenol over the weekend. I have written a letter with work restrictions to remain in place through next Friday. Can reassess as indicated beyond that time      Lorenzo Mercer MD  [facility]  03/13/2020, 9:10 AM

## 2020-03-13 NOTE — LETTER
March 13, 2020      RE: Radha Camargo  [address] [address]       To whom it may concern:    Radha Camargo was seen in our clinic today. She  may return to work with the following restrictions:   - Light duty-unable to lift more than 5 pounds  - Unable to push or pull more than 5 pounds  Restrictions to remain in place for at least through 3/20 or follow up assessment.     Sincerely,      Lorenzo Mercer MD

## 2020-03-18 ENCOUNTER — THERAPY VISIT (OUTPATIENT)
Dept: OCCUPATIONAL THERAPY | Facility: CLINIC | Age: 45
End: 2020-03-18
Attending: FAMILY MEDICINE
Payer: OTHER MISCELLANEOUS

## 2020-03-18 DIAGNOSIS — S69.91XD INJURY OF RIGHT WRIST, SUBSEQUENT ENCOUNTER: ICD-10-CM

## 2020-03-18 DIAGNOSIS — M25.531 RIGHT WRIST PAIN: Primary | ICD-10-CM

## 2020-03-18 PROCEDURE — 97110 THERAPEUTIC EXERCISES: CPT | Mod: GO | Performed by: OCCUPATIONAL THERAPIST

## 2020-03-18 PROCEDURE — 97112 NEUROMUSCULAR REEDUCATION: CPT | Mod: GO | Performed by: OCCUPATIONAL THERAPIST

## 2020-03-18 PROCEDURE — 97165 OT EVAL LOW COMPLEX 30 MIN: CPT | Mod: GO | Performed by: OCCUPATIONAL THERAPIST

## 2020-03-18 NOTE — PROGRESS NOTES
Hand Therapy Initial Evaluation    Current Date:  3/18/2020    Diagnosis: Right wrist pain  DOI: 3/11/20  Referring physician: Lorenzo Mercer MD    Precautions: 5-pound lifting restriction    Subjective:  Radha Camargo is a 44 year old female.    Patient reports symptoms of the right wrist which occurred when her wrist was caught between two crates at work. Since onset symptoms are Unchanged  General health as reported by patient is good.  Pertinent medical history includes:Depression  Medical allergies: None.  Surgical history: other: Tonsillectomy in 2018.  Medication history: None.    Current occupation is a plater   Job Tasks: Lifting, Carrying, Operating a Machine, Assembly, Prolonged Standing, Pushing, Pulling, Repetitive Tasks    Occupational Profile Information:  Right hand dominant  Prior functional level:  no limitations  Patient reports symptoms of pain, stiffness/loss of motion, weakness/loss of strength, edema and tingling   Special tests:  x-ray.    Previous treatment: Wrist brace  Barriers include:none  Mobility: No difficulty  Transportation: drives  Currently working in normal job with restrictions. On light duty, no lifting greater than 5 pounds  Leisure activities/hobbies: Fishing  Other: Crush injury occurred at distal forearm/wrist crease      Objective:  Pain Level (Scale 0-10)   3/18/2020   At Rest 3-4/10   With Use Up to 6/10     Pain Description  Date 3/18/2020   Location elbow, wrist and hand   Pain Quality Aching, Sharp and Shooting   Frequency constant     Pain is worst  daytime   Exacerbated by  Use   Relieved by cold, NSAIDs and rest   Progression Unchanged     Edema  Circumference:  (Measured in cm)  [facility] 3/18/2020   Right 16.0   Left 15.5   Mild ecchymosis over dorsum of hand.    Sensation   WNL throughout all nerve distributions; per patient report. However, patient reports a tingling sensation over the volar aspect of the wrist at the carpal tunnel. Positive Tinel at DRSN and along  radial nerve pathway. Positive Tinel at carpal tunnel.    ROM     Pain Report: - none  + mild    ++ moderate    +++ severe   Wrist 3/18/2020 3/18/2020   AROM (PROM) Left Right   Extension 60 53+  Dorsum and volar aspects of wrist   Flexion 60 50+  Dorsum and volar aspects of wrist   RD 20 20+   UD 35 30+   Supination 85 70   Pronation 85 70   Able to make a loose fist with the right hand. Able to actively flex and extend the thumb. Opposes to the MP joint of the small finger.      Strength   (Measured in pounds)  Pain Report: - none  + mild    ++ moderate    +++ severe    3/18/2020 3/18/2020   Trials Left Right   1 56 5+  Dorsal and volar wrist     Lat Pinch 3/18/2020 3/18/2020   Trials Left Right   1 13 5+     3 Pt Pinch 3/18/2020 3/18/2020   Trials Left Right   1 12 3+     Palpation  Tender to palpation at dorsal scaphoid and lunate.       Assessment:  Patient presents with symptoms consistent with diagnosis of the above condition,  with conservative intervention.     Patient's limitations or Problem List includes:  Pain, Decreased ROM/motion, Increased edema, Sensory disturbance, Decreased  and Decreased pinch of the right wrist and hand which interferes with the patient's ability to perform Self Care Tasks (dressing, eating, bathing, hygiene/toileting), Work Tasks, Recreational Activities, Household Chores and Driving  as compared to previous level of function.    Rehab Potential:  Excellent - Return to full activity, no limitations    Patient will benefit from skilled Occupational Therapy to increase ROM,  strength, pinch strength and sensation and decrease pain and edema to return to previous activity level and resume normal daily tasks and to reach their rehab potential.    Barriers to Learning:  No barrier    Communication Issues:  Patient appears to be able to clearly communicate and understand verbal and written communication and follow directions correctly.    Chart Review: Chart  Review    Identified Performance Deficits: bathing/showering, toileting, dressing, feeding, hygiene and grooming, home establishment and management, meal preparation and cleanup, shopping, work and leisure activities    Assessment of Occupational Performance:  1-3 Performance Deficits    Clinical Decision Making (Complexity): Low complexity    Treatment Explanation:  The following has been discussed with the patient:  RX ordered/plan of care  Anticipated outcomes  Possible risks and side effects    Plan:  Frequency:  1 X week, once daily  Duration:  for 6 weeks    Treatment Plan:   Modalities:  US and Fluidotherapy  Therapeutic Exercise:  AROM, PROM, Tendon Gliding, Isotonics and Isometrics  Neuromuscular re-education:  Nerve Gliding and Kinesiotaping  Manual Techniques:  Myofascial release and Manual edema mobilization  Orthotic Fabrication:  As indicated    Discharge Plan:  Achieve all LTG.  Independent in home treatment program.  Reach maximal therapeutic benefit.    Home Exercise Program:  OTC wrist brace, remove for hygiene, exercises, and light activity  Wrist AROM  Nerve gliding    Next Visit:  Re-assess symptoms  Progress ROM

## 2020-03-26 ENCOUNTER — TELEPHONE (OUTPATIENT)
Dept: FAMILY MEDICINE | Facility: CLINIC | Age: 45
End: 2020-03-26

## 2020-03-26 DIAGNOSIS — R42 VERTIGO: Primary | ICD-10-CM

## 2020-03-26 DIAGNOSIS — J30.1 SEASONAL ALLERGIC RHINITIS DUE TO POLLEN: ICD-10-CM

## 2020-03-26 RX ORDER — FLUTICASONE PROPIONATE 50 MCG
2 SPRAY, SUSPENSION (ML) NASAL DAILY
Qty: 16 G | Refills: 6 | Status: SHIPPED | OUTPATIENT
Start: 2020-03-26

## 2020-03-26 RX ORDER — MECLIZINE HYDROCHLORIDE 25 MG/1
25 TABLET ORAL 3 TIMES DAILY PRN
Qty: 30 TABLET | Refills: 0 | Status: SHIPPED | OUTPATIENT
Start: 2020-03-26 | End: 2020-11-06

## 2020-03-26 NOTE — TELEPHONE ENCOUNTER
[facility] phone call message-patient reporting a symptom:     Symptom: dizziness headaches  Sore throat runny noise    When did symptoms begin? [email or web address]    Characteristics: (location on body, intensity, what makes it better or worse, associated symptoms):      Additional Details:  Dizzy vommiting      Same Day Visit Offered: Yes, declined    Additional comments: pt want to speak with triage nurse regarding symptom would you please contacte    OK to leave message on voice mail? Yes    Advised patient that RN would call back within 3 hours, unless emergent   Primary language: English      needed? No    Call taken on March 26, 2020 at 4:04 PM by Kedar Zheng

## 2020-03-26 NOTE — TELEPHONE ENCOUNTER
"RN spoke with patient who states yesterday she woke up with severe dizziness, which has been making her vomit. She developed a sore throat, runny nose and small cough. The largest concern remains the dizziness since it is preventing her from drinking the amount she knows she needs, since every time she moves she vomits. She states she is starting to feel dehydrated. She is able to walk/move, but has to \"move like a grandma\", any faster she will vomit. Her  has been helping her walk to the bathroom. She cannot move her head too quickly either. Denies fever, but states she feels freezing.     RN consulted with Dr. Minaya who states he will call patient to talk through things further and lay out some recommendations. Routed him the chart and notified patient that she will be hearing from him.  Sasha Bravo RN   "

## 2020-03-27 NOTE — TELEPHONE ENCOUNTER
Copying Dr. Minaya's routing message:   Called patient.  Dizziness only with movement of head and completely resolves with holding still.  Good sign it is not a stroke.  Does have seasonal allergies which have been worse.  So mild cough and nasal congestion may be from that.  Could have viral illness, or crystals in inner ear (BPPV).  Discussed this with patient.  Not vomiting when holding still.  Does have body aches today.  No fever, no chest discomfort or shortness of breath.  Discussed COVID can be any number of these--if she is getting worse, especially increasing fever, sob, or pain with breathing, then could do [facility] visit to see if she could be tested.  If markedly worse, will go to ED.  Will call in Flonase and Meclizine to help with symptoms.  Patient will try these for a few days and will call in if not improving for follow up.  Sina Minaya MD    Routing comment

## 2020-04-17 ENCOUNTER — VIRTUAL VISIT (OUTPATIENT)
Dept: FAMILY MEDICINE | Facility: CLINIC | Age: 45
End: 2020-04-17
Payer: OTHER MISCELLANEOUS

## 2020-04-17 ENCOUNTER — TELEPHONE (OUTPATIENT)
Dept: FAMILY MEDICINE | Facility: CLINIC | Age: 45
End: 2020-04-17

## 2020-04-17 DIAGNOSIS — S69.91XD INJURY OF RIGHT WRIST, SUBSEQUENT ENCOUNTER: Primary | ICD-10-CM

## 2020-04-17 NOTE — LETTER
April 17, 2020      RE: Radha Camargo  [address] [address]       To whom it may concern:    Radha Camargo was seen for a virtual visit at our clinic today. Due to the limitations currently imposed as a result of the Coronavirus pandemic, she was unable to actually be seen in clinic. I reviewed Ms. Camargo's chart and we discussed her recovery from her recent work injury. At this time, Ms. Camargo reports she has completely recovered and is capable of returning to work, full time, without restrictions.     Please, feel free to contact this clinic if you have any further questions or concerns.    Sincerely,      Lorenzo Mercer

## 2020-04-17 NOTE — PROGRESS NOTES
"Radha Camargo is a 44 year old female who is being evaluated via a billable telephone visit.      The patient has been notified of following:     \"This telephone visit will be conducted via a call between you and your physician/provider. We have found that certain health care needs can be provided without the need for a physical exam.  This service lets us provide the care you need with a short phone conversation.  If a prescription is necessary we can send it directly to your pharmacy.  If lab work is needed we can place an order for that and you can then stop by our lab to have the test done at a later time.    Telephone visits are billed at different rates depending on your insurance coverage. During this emergency period, for some insurers they may be billed the same as an in-person visit.  Please reach out to your insurance provider with any questions.    If during the course of the call the physician/provider feels a telephone visit is not appropriate, you will not be charged for this service.\"    Patient has given verbal consent for Telephone visit?  Yes    How would you like to obtain your AVS? Mayelin Stauffer     Radha Camargo is a 44 year old female who presents to clinic today for the following health issues:    PROBLEMS TO ADD ON...  Workmens' Comp injury  - previously seen for this, initial visit on 3/13 for injury which occurred on 3/11  - initial injury involved crushing injury to R wrist without bone fracture  - has been pursuing PT as well as possible given current social restrictions due to pandemic  - per patient, as of today, she has completely recovered from her injury and has full strength, full ROM and no residual pain or sensory loss to her R wrist and hand  - patient is requesting letter stating this, allowing her to return to work without restrictions    Patient Active Problem List   Diagnosis     Irregular menses     Metrorrhagia     Hair loss     IUD (intrauterine device) in place     " Laryngeal hyperfunction     Dysphonia     Major depressive disorder with single episode, in full remission (H)     Family history of malignant neoplasm of breast     Right wrist pain     Injury of right wrist, subsequent encounter     Past Surgical History:   Procedure Laterality Date     TONSILLECTOMY Bilateral 12/28/2018    Procedure: Bilateral Tonsillectomy;  Surgeon: Ameya Nam MD;  Location:  OR       Social History     Tobacco Use     Smoking status: Never Smoker     Smokeless tobacco: Never Used   Substance Use Topics     Alcohol use: No     Family History   Problem Relation Age of Onset     Breast Cancer Mother 61     Other Cancer Mother 30        ovarian     Hypertension Mother      Anxiety Disorder Mother      Depression Mother      Obesity Mother      Thyroid Disease Maternal Grandmother      Obesity Maternal Grandmother      Coronary Artery Disease Paternal Grandmother      Diabetes Paternal Grandfather      Unknown/Adopted Paternal Grandfather      Colon Cancer Brother      Other Cancer Sister      Breast Cancer Sister 35     Thyroid Disease Cousin      Obesity Cousin      Breast Cancer Cousin      Breast Cancer Cousin          Current Outpatient Medications   Medication Sig Dispense Refill     Cholecalciferol (VITAMIN D) 2000 units tablet Take 2,000 Units by mouth daily 100 tablet 3     ferrous sulfate (IRON) 325 (65 FE) MG tablet Take 1 tablet (325 mg) by mouth daily (with breakfast) 30 tablet 2     fluticasone (FLONASE) 50 MCG/ACT nasal spray Spray 2 sprays into both nostrils daily 16 g 6     levonorgestrel (MIRENA) 20 MCG/24HR IUD 1 each (20 mcg) by Intrauterine route continuous       ibuprofen (ADVIL/MOTRIN) 600 MG tablet Take 1 tablet (600 mg) by mouth every 6 hours as needed for moderate pain (Patient not taking: Reported on 4/17/2020) 30 tablet 0     IBUPROFEN PO Take 400 mg by mouth once       meclizine (ANTIVERT) 25 MG tablet Take 1 tablet (25 mg) by mouth 3 times daily as  needed for dizziness (Patient not taking: Reported on 4/17/2020) 30 tablet 0     Allergies   Allergen Reactions     Seasonal Allergies        Reviewed and updated as needed this visit by Provider  Allergies  Meds  Problems  Med Hx  Surg Hx         Review of Systems   ROS COMP: Constitutional, HEENT, cardiovascular, pulmonary, gi and gu systems are negative, except as otherwise noted.    Objective   Reported vitals:  There were no vitals taken for this visit.   PSYCH: Alert; coherent speech, normal   rate and volume, able to articulate logical thoughts, able   to abstract reason, no tangential thoughts, no hallucinations   or delusions  Her affect is normal, pleasant and full  RESP: No cough, no audible wheezing, able to talk in full sentences  Remainder of exam unable to be completed due to telephone visits    Assessment/Plan:  Radha was seen today for letter request.    Diagnoses and all orders for this visit:    Injury of right wrist, subsequent encounter    Letter written and saved to patient's chart. [employer] message to patient advising her on instructions for printing the letter for her work. Advised patient, if there is a problem printing the letter from the [employer] portal, we will either need to fax her a letter, or leave a letter at the  for her to come and .     Phone call duration:  8 minutes    Lorenzo Mercer MD  3:16 PM, April 17, 2020

## 2020-04-17 NOTE — TELEPHONE ENCOUNTER
Health Call Center    Phone Message    May a detailed message be left on voicemail: yes     Reason for Call: Form or Letter   Type or form/letter needing completion: pt needs a letter to get back to work. She needs restrictions lifted. Please call to discuss. She may need an appt?  Provider: Dr Mercer  Date form needed: ASAP  Once completed: Patient will  at . Call patient to discuss this      Action Taken: Message routed to:  [facility] [facility] [place]: Not Applicable

## 2020-04-17 NOTE — TELEPHONE ENCOUNTER
Called and LVM - patient can call back to discuss with clinic nurse about specific needs. If she is needing a return to work note, she can schedule an appointment with Dr. Mercer to discuss her needs, although this would be a Telephone/Video visit due to current pandemic.     Salena Cochran RN  04/17/20  1:43 PM

## 2020-04-30 PROBLEM — S69.91XD INJURY OF RIGHT WRIST, SUBSEQUENT ENCOUNTER: Status: RESOLVED | Noted: 2020-03-18 | Resolved: 2020-04-30

## 2020-04-30 PROBLEM — M25.531 RIGHT WRIST PAIN: Status: RESOLVED | Noted: 2020-03-18 | Resolved: 2020-04-30

## 2020-05-30 NOTE — TELEPHONE ENCOUNTER
"Update:    Radha called this provider back. Apologized for missing appointment. Said she \"spaced out\" and was at work. Reassured patient and inquired about rescheduling. Radha said she will call back after work to reschedule appointment. She noted that she has felt a lot of improvement from antidepressant medication, however, would still like to talk about the stressors that impacted her mood. Thanked Radha for calling back and providing this update. Will plan to see Radha when she schedules her next  appointment.     Samaria Lai, PhD.  Behavioral Health Fellow    "
This provider placed an outreach call to the patient as she did not show for her scheduled appointment today 12/13/2019. Left a message requesting the patient call the clinic and re-schedule if she is interested. One additional outreach call will be placed by this provider. If that attempt is unsuccessful, a letter will be sent. Following that letter, no additional outreach attempts will be made unless contacted by another referring provider.     Samaria Lai, PhD        
English

## 2020-07-30 ENCOUNTER — VIRTUAL VISIT (OUTPATIENT)
Dept: FAMILY MEDICINE | Facility: OTHER | Age: 45
End: 2020-07-30
Payer: COMMERCIAL

## 2020-07-30 PROCEDURE — 99421 OL DIG E/M SVC 5-10 MIN: CPT | Performed by: PHYSICIAN ASSISTANT

## 2020-07-30 NOTE — PROGRESS NOTES
"Date: 2020 12:34:08  Clinician: Roe Bhatia  Clinician NPI: [id number]  Patient: Radha Camargo  Patient : 1975  Patient Address: [address] [place] [address]  Patient Phone: [phone]  Visit Protocol: URI  Patient Summary:  Radha is a 44 year old ( : 1975 ) female who initiated a Visit for COVID-19 (Coronavirus) evaluation and screening. When asked the question \"Please sign me up to receive news, health information and promotions from [employer].\", Radha responded \"No\".    Radha states her symptoms started 1-2 days ago.   Her symptoms consist of a sore throat, a cough, nasal congestion, malaise, and a headache. She is experiencing difficulty breathing due to nasal congestion but she is not short of breath.   Symptom details     Nasal secretions: The color of her mucus is white.    Cough: Radha coughs a few times an hour and her cough is not more bothersome at night. Phlegm does not come into her throat when she coughs. She does not believe her cough is caused by post-nasal drip.     Sore throat: Radha reports having moderate throat pain (4-6 on a 10 point pain scale), does not have exudate on her tonsils, and can swallow liquids. She is not sure if the lymph nodes in her neck are enlarged. A rash has not appeared on the skin since the sore throat started.     Headache: She states the headache is mild (1-3 on a 10 point pain scale).      Radha denies having wheezing, nausea, teeth pain, ageusia, diarrhea, myalgias, anosmia, facial pain or pressure, fever, vomiting, rhinitis, ear pain, and chills. She also denies having recent facial or sinus surgery in the past 60 days and taking antibiotic medication in the past month.   Precipitating events  Within the past week, Radha has not been exposed to someone with strep throat. She has not recently been exposed to someone with influenza. Radha has been in close contact with the following high risk individuals: adults 65 or older and " immunocompromised people.   Pertinent COVID-19 (Coronavirus) information  In the past 14 days, Radha has not worked in a congregate living setting.   She does not work or volunteer as healthcare worker or a  and does not work or volunteer in a healthcare facility.   Radha also has not lived in a congregate living setting in the past 14 days. She does not live with a healthcare worker.   Radha has had a close contact with a laboratory-confirmed COVID-19 patient within 14 days of symptom onset. Additional information about contact with COVID-19 (Coronavirus) patient as reported by the patient (free text): July 21-23 with a co worker that has tested positive for covid   Pertinent medical history  Radha does not get yeast infections when she takes antibiotics.   Radha needs a return to work/school note.   Weight: 155 lbs   Radha smokes or uses smokeless tobacco.   She denies pregnancy and denies breastfeeding. She has menstruated in the past month.   Weight: 155 lbs    MEDICATIONS: Flonase Allergy Relief nasal, ALLERGIES: NKDA  Clinician Response:  Dear Radha,   Your symptoms show that you may have coronavirus (COVID-19). This illness can cause fever, cough and trouble breathing. Many people get a mild case and get better on their own. Some people can get very sick.  What should I do?  We would like to test you for this virus.   1. Please call [phone] to schedule your visit. Explain that you were referred by [facility] to have a COVID-19 test. Be ready to share your [facility] visit ID number.  The following will serve as your written order for this COVID Test, ordered by me, for the indication of suspected COVID [Z20.828]: The test will be ordered in [facility], our electronic health record, after you are scheduled. It will show as ordered and authorized by Barrie Abbott MD.  Order: COVID-19 (Coronavirus) PCR for SYMPTOMATIC testing from [facility].      2. When it's time for your COVID test:  Stay at least 6 feet away  "from others. (If someone will drive you to your test, stay in the backseat, as far away from the  as you can.)   Cover your mouth and nose with a mask, tissue or washcloth.  Go straight to the testing site. Don't make any stops on the way there or back.      3.Starting now: Stay home and away from others (self-isolate) until:   You've had no fever---and no medicine that reduces fever---for 3 full days (72 hours). And...   Your other symptoms have gotten better. For example, your cough or breathing has improved. And...   At least 10 days have passed since your symptoms started.       During this time, don't leave the house except for testing or medical care.   Stay in your own room, even for meals. Use your own bathroom if you can.   Stay away from others in your home. No hugging, kissing or shaking hands. No visitors.  Don't go to work, school or anywhere else.    Clean \"high touch\" surfaces often (doorknobs, counters, handles, etc.). Use a household cleaning spray or wipes. You'll find a full list of  on the EPA website: www.epa.gov/pesticide-registration/list-n-disinfectants-use-against-sars-cov-2.   Cover your mouth and nose with a mask, tissue or washcloth to avoid spreading germs.  Wash your hands and face often. Use soap and water.  Caregivers in these groups are at risk for severe illness due to COVID-19:  o People 65 years and older  o People who live in a nursing home or long-term care facility  o People with chronic disease (lung, heart, cancer, diabetes, kidney, liver, immunologic)  o People who have a weakened immune system, including those who:   Are in cancer treatment  Take medicine that weakens the immune system, such as corticosteroids  Had a bone marrow or organ transplant  Have an immune deficiency  Have poorly controlled HIV or AIDS  Are obese (body mass index of 40 or higher)  Smoke regularly   o Caregivers should wear gloves while washing dishes, handling laundry and cleaning " bedrooms and bathrooms.  o Use caution when washing and drying laundry: Don't shake dirty laundry, and use the warmest water setting that you can.  o For more tips, go to www.cdc.gov/coronavirus/2019-ncov/downloads/10Things.pdf.    4.Sign up for [employer]. We know it's scary to hear that you might have COVID-19. We want to track your symptoms to make sure you're okay over the next 2 weeks. Please look for an email from [employer]---this is a free, online program that we'll use to keep in touch. To sign up, follow the link in the email. Learn more at [email or web address]  How can I take care of myself?   Get lots of rest. Drink extra fluids (unless a doctor has told you not to).   Take Tylenol (acetaminophen) for fever or pain. If you have liver or kidney problems, ask your family doctor if it's okay to take Tylenol.   Adults can take either:    650 mg (two 325 mg pills) every 4 to 6 hours, or...   1,000 mg (two 500 mg pills) every 8 hours as needed.    Note: Don't take more than 3,000 mg in one day. Acetaminophen is found in many medicines (both prescribed and over-the-counter medicines). Read all labels to be sure you don't take too much.   For children, check the Tylenol bottle for the right dose. The dose is based on the child's age or weight.    If you have other health problems (like cancer, heart failure, an organ transplant or severe kidney disease): Call your specialty clinic if you don't feel better in the next 2 days.       Know when to call 911. Emergency warning signs include:    Trouble breathing or shortness of breath Pain or pressure in the chest that doesn't go away Feeling confused like you haven't felt before, or not being able to wake up Bluish-colored lips or face.  Where can I get more information?    [facility] -- About COVID-19: [email or web address]   CDC -- What to Do If You're Sick: www.cdc.gov/coronavirus/2019-ncov/about/steps-when-sick.html   [facility] --  Ending Home Isolation: www.cdc.gov/coronavirus/2019-ncov/hcp/disposition-in-home-patients.html   CDC -- Caring for Someone: www.cdc.gov/coronavirus/2019-ncov/if-you-are-sick/care-for-someone.html   [facility] -- Interim Guidance for Hospital Discharge to Home: www.[facility].mn./diseases/coronavirus/hcp/hospdischarge.pdf   [facility] clinical trials (COVID-19 research studies): clinicalaffairs.[facility]/n-clinical-trials    Below are the COVID-19 hotlines at the [place] Department of [facility] ([facility]). Interpreters are available.    For health questions: Call 854-698-4590 or 1-304.374.2086 (7 a.m. to 7 p.m.) For questions about schools and childcare: Call 490-825-4082 or 1-881.940.4164 (7 a.m. to 7 p.m.)    Diagnosis: Nasal congestion  Diagnosis ICD: R09.81

## 2020-07-31 DIAGNOSIS — Z20.822 SUSPECTED COVID-19 VIRUS INFECTION: Primary | ICD-10-CM

## 2020-07-31 PROCEDURE — U0003 INFECTIOUS AGENT DETECTION BY NUCLEIC ACID (DNA OR RNA); SEVERE ACUTE RESPIRATORY SYNDROME CORONAVIRUS 2 (SARS-COV-2) (CORONAVIRUS DISEASE [COVID-19]), AMPLIFIED PROBE TECHNIQUE, MAKING USE OF HIGH THROUGHPUT TECHNOLOGIES AS DESCRIBED BY CMS-2020-01-R: HCPCS | Performed by: FAMILY MEDICINE

## 2020-08-01 LAB
SARS-COV-2 RNA SPEC QL NAA+PROBE: NOT DETECTED
SPECIMEN SOURCE: NORMAL

## 2020-11-06 ENCOUNTER — OFFICE VISIT (OUTPATIENT)
Dept: FAMILY MEDICINE | Facility: CLINIC | Age: 45
End: 2020-11-06
Payer: COMMERCIAL

## 2020-11-06 VITALS
DIASTOLIC BLOOD PRESSURE: 78 MMHG | SYSTOLIC BLOOD PRESSURE: 112 MMHG | BODY MASS INDEX: 29.26 KG/M2 | HEART RATE: 76 BPM | WEIGHT: 159 LBS | OXYGEN SATURATION: 99 % | HEIGHT: 62 IN | TEMPERATURE: 98 F

## 2020-11-06 DIAGNOSIS — Z23 NEED FOR VACCINATION: ICD-10-CM

## 2020-11-06 DIAGNOSIS — Z00.00 ROUTINE GENERAL MEDICAL EXAMINATION AT A HEALTH CARE FACILITY: Primary | ICD-10-CM

## 2020-11-06 DIAGNOSIS — Z13.1 DIABETES MELLITUS SCREENING: ICD-10-CM

## 2020-11-06 DIAGNOSIS — Z13.220 LIPID SCREENING: ICD-10-CM

## 2020-11-06 DIAGNOSIS — Z12.31 ENCOUNTER FOR SCREENING MAMMOGRAM FOR BREAST CANCER: ICD-10-CM

## 2020-11-06 LAB
CHOLEST SERPL-MCNC: 162 MG/DL
GLUCOSE SERPL-MCNC: 84 MG/DL (ref 70–99)
HDLC SERPL-MCNC: 61 MG/DL
LDLC SERPL CALC-MCNC: 93 MG/DL
NONHDLC SERPL-MCNC: 101 MG/DL
TRIGL SERPL-MCNC: 38 MG/DL

## 2020-11-06 PROCEDURE — 80061 LIPID PANEL: CPT | Performed by: FAMILY MEDICINE

## 2020-11-06 PROCEDURE — 36415 COLL VENOUS BLD VENIPUNCTURE: CPT | Performed by: FAMILY MEDICINE

## 2020-11-06 PROCEDURE — 90686 IIV4 VACC NO PRSV 0.5 ML IM: CPT | Performed by: FAMILY MEDICINE

## 2020-11-06 PROCEDURE — 99396 PREV VISIT EST AGE 40-64: CPT | Mod: 25 | Performed by: FAMILY MEDICINE

## 2020-11-06 PROCEDURE — 90471 IMMUNIZATION ADMIN: CPT | Performed by: FAMILY MEDICINE

## 2020-11-06 PROCEDURE — 82947 ASSAY GLUCOSE BLOOD QUANT: CPT | Performed by: FAMILY MEDICINE

## 2020-11-06 ASSESSMENT — MIFFLIN-ST. JEOR: SCORE: 1329.47

## 2020-11-06 ASSESSMENT — PATIENT HEALTH QUESTIONNAIRE - PHQ9: SUM OF ALL RESPONSES TO PHQ QUESTIONS 1-9: 0

## 2020-11-06 NOTE — PROGRESS NOTES
SUBJECTIVE:   CC: Radha Camargo is an 45 year old woman who presents for preventive health visit.       Patient has been advised of split billing requirements and indicates understanding: Yes  Healthy Habits:     Getting at least 3 servings of Calcium per day:  Yes    Bi-annual eye exam:  NO    Dental care twice a year:  NO    Sleep apnea or symptoms of sleep apnea:  None    Diet:  Regular (no restrictions)    Frequency of exercise:  None    Taking medications regularly:  Yes    Medication side effects:  None    PHQ-2 Total Score: 0    Additional concerns today:  No          Today's PHQ-2 Score:   PHQ-2 ( 1999 Pfizer) 11/1/2020   Q1: Little interest or pleasure in doing things 0   Q2: Feeling down, depressed or hopeless 0   PHQ-2 Score 0   Q1: Little interest or pleasure in doing things Not at all   Q2: Feeling down, depressed or hopeless Not at all   PHQ-2 Score 0       Abuse: Current or Past (Physical, Sexual or Emotional) - No  Do you feel safe in your environment? Yes        Social History     Tobacco Use     Smoking status: Never Smoker     Smokeless tobacco: Never Used   Substance Use Topics     Alcohol use: No     If you drink alcohol do you typically have >3 drinks per day or >7 drinks per week? No    Alcohol Use 11/1/2020   Prescreen: >3 drinks/day or >7 drinks/week? No       Reviewed orders with patient.  Reviewed health maintenance and updated orders accordingly - Yes  Lab work is in process  Labs reviewed in EPIC  BP Readings from Last 3 Encounters:   11/06/20 112/78   03/13/20 116/83   03/06/20 116/76    Wt Readings from Last 3 Encounters:   11/06/20 72.1 kg (159 lb)   03/13/20 71 kg (156 lb 8 oz)   03/06/20 72.5 kg (159 lb 12.8 oz)                  Patient Active Problem List   Diagnosis     Irregular menses     Metrorrhagia     Hair loss     IUD (intrauterine device) in place     Laryngeal hyperfunction     Dysphonia     Major depressive disorder with single episode, in full remission (H)     Family  history of malignant neoplasm of breast     Past Surgical History:   Procedure Laterality Date     TONSILLECTOMY Bilateral 12/28/2018    Procedure: Bilateral Tonsillectomy;  Surgeon: Ameya Nam MD;  Location:  OR       Social History     Tobacco Use     Smoking status: Never Smoker     Smokeless tobacco: Never Used   Substance Use Topics     Alcohol use: No     Family History   Problem Relation Age of Onset     Breast Cancer Mother 61     Other Cancer Mother 30        ovarian     Hypertension Mother      Anxiety Disorder Mother      Depression Mother      Obesity Mother      Thyroid Disease Maternal Grandmother      Obesity Maternal Grandmother      Coronary Artery Disease Paternal Grandmother      Diabetes Paternal Grandfather      Unknown/Adopted Paternal Grandfather      Colon Cancer Brother      Other Cancer Sister      Breast Cancer Sister 35     Thyroid Disease Cousin      Obesity Cousin      Breast Cancer Cousin      Breast Cancer Cousin          Current Outpatient Medications   Medication Sig Dispense Refill     Cholecalciferol (VITAMIN D) 2000 units tablet Take 2,000 Units by mouth daily 100 tablet 3     ferrous sulfate (IRON) 325 (65 FE) MG tablet Take 1 tablet (325 mg) by mouth daily (with breakfast) 30 tablet 2     fluticasone (FLONASE) 50 MCG/ACT nasal spray Spray 2 sprays into both nostrils daily 16 g 6     IBUPROFEN PO Take 400 mg by mouth once       levonorgestrel (MIRENA) 20 MCG/24HR IUD 1 each (20 mcg) by Intrauterine route continuous         Mammogram Screening: Patient under age 50, mutual decision reflected in health maintenance.      Pertinent mammograms are reviewed under the imaging tab.  History of abnormal Pap smear: NO - age 30-65 PAP every 5 years with negative HPV co-testing recommended  PAP / HPV Latest Ref Rng & Units 3/23/2018   PAP - NIL   HPV 16 DNA NEG:Negative Negative   HPV 18 DNA NEG:Negative Negative   OTHER HR HPV NEG:Negative Negative     Reviewed and  "updated as needed this visit by clinical staff                 Reviewed and updated as needed this visit by Provider                    Review of Systems       OBJECTIVE:   There were no vitals taken for this visit.  Physical Exam    General Appearance: Pleasant, alert, in no acute respiratory distress.  Head Exam: Normal. Normocephalic, atraumatic. No sinus tenderness.  Eye Exam: Normal external eye, conjunctiva, lids. GOKUL.  Ear Exam: Normal auditory canals and external ears. Non-tender.  Left TM-normal. Right TM-normal.  Neck Exam: Supple, no obvious thyroid enlargement  Chest/Respiratory Exam: Normal, comfortable, easy respirations. Chest wall normal. Lungs are clear to auscultation. No wheezing, crackles, or rhonchi.  Cardiovascular Exam: Regular rate and rhythm. No murmur, gallop, or rubs.  Musculoskeletal Exam: Back is non-tender, full ROM of upper and lower extremities.  Skin: no rash, warm and dry.   Neurologic Exam: Nonfocal, no tremor. Normal gait.  Psychiatric Exam: Alert - appropriate, normal affect      ASSESSMENT/PLAN:   1. Routine general medical examination at a health care facility      2. Need for vaccination    - HC FLU VAC PRESRV FREE QUAD SPLIT VIR > 6 MONTHS IM    3. Lipid screening    - Lipid Profile (Chol, Trig, HDL, LDL calc)    4. Diabetes mellitus screening    - Glucose    5. Encounter for screening mammogram for breast cancer    - *MA Screening Digital Bilateral; Future    Patient has been advised of split billing requirements and indicates understanding: Yes  COUNSELING:  Reviewed preventive health counseling, as reflected in patient instructions    Estimated body mass index is 28.8 kg/m  as calculated from the following:    Height as of 3/13/20: 1.57 m (5' 1.81\").    Weight as of 3/13/20: 71 kg (156 lb 8 oz).    Weight management plan: Discussed healthy diet and exercise guidelines    She reports that she has never smoked. She has never used smokeless tobacco.      Counseling " Resources:  ATP IV Guidelines  Pooled Cohorts Equation Calculator  Breast Cancer Risk Calculator  BRCA-Related Cancer Risk Assessment: FHS-7 Tool  FRAX Risk Assessment  ICSI Preventive Guidelines  Dietary Guidelines for Americans, 2010  USDA's MyPlate  ASA Prophylaxis  Lung CA Screening    Edvin Lin MD  [facility]

## 2020-11-11 ENCOUNTER — OFFICE VISIT (OUTPATIENT)
Dept: FAMILY MEDICINE | Facility: CLINIC | Age: 45
End: 2020-11-11
Payer: COMMERCIAL

## 2020-11-11 VITALS — OXYGEN SATURATION: 99 % | HEART RATE: 82 BPM | TEMPERATURE: 98 F

## 2020-11-11 DIAGNOSIS — Z20.822 EXPOSURE TO 2019 NOVEL CORONAVIRUS: Primary | ICD-10-CM

## 2020-11-11 PROCEDURE — U0003 INFECTIOUS AGENT DETECTION BY NUCLEIC ACID (DNA OR RNA); SEVERE ACUTE RESPIRATORY SYNDROME CORONAVIRUS 2 (SARS-COV-2) (CORONAVIRUS DISEASE [COVID-19]), AMPLIFIED PROBE TECHNIQUE, MAKING USE OF HIGH THROUGHPUT TECHNOLOGIES AS DESCRIBED BY CMS-2020-01-R: HCPCS | Performed by: STUDENT IN AN ORGANIZED HEALTH CARE EDUCATION/TRAINING PROGRAM

## 2020-11-11 PROCEDURE — 99213 OFFICE O/P EST LOW 20 MIN: CPT | Performed by: STUDENT IN AN ORGANIZED HEALTH CARE EDUCATION/TRAINING PROGRAM

## 2020-11-11 NOTE — PROGRESS NOTES
HPI       Radha Camargo is a 45 year old  who presents for   Chief Complaint   Patient presents with     COVID exposure         Concern: COVID exposure + symptoms   Description of the problem : Multiple exposures at work. First exp on 17 days, then 14 days, then 10 days, 9 days, then 3 days ago.  Symptoms are cold like illness 3-4 days ago. Wanted to see what happened first, but wanted to come in now that she was noticing shortness of breath in her chest and sore throat. Dyspnea only when walking. Cough, non productive. Non smoker. Did have neg COVID test in August. Husb and mom with health issues. Is staying in guest room at home.         +++++++    Problem, Medication and Allergy Lists were reviewed and updated if needed..    Patient is an established patient of this clinic..         Review of Systems:   Review of Systems   ROS: 10 point ROS neg other than the symptoms noted above in the HPI.         Physical Exam:     Vitals:    11/11/20 1532   Pulse: 82   Temp: 98  F (36.7  C)   SpO2: 99%     There is no height or weight on file to calculate BMI.  Vitals were reviewed and were normal     Physical Exam  General: Alert and oriented, in no acute distress.  Skin: Warm and dry, no abnormalities noted.  Eyes: Extra-ocular muscles intact, pupils equal and reactive.  ENT: Speech intact, nasal passages open, no hearing impairment noted.  CV: No cyanosis or pallor, warm and well perfused.  Respiratory: No respiratory distress, no accessory muscle use. Lungs clear to ausc bilat.   Neuro: Gait and station normal, comprehension intact. Gross and fine motor skills intact.   Psychiatric: Mood and affect appear normal.   Extremities: Warm, able to move all four extremities at will.      Results:   Results are ordered and pending    Assessment and Plan        Radha was seen today for covid exposure.    Diagnoses and all orders for this visit:    Exposure to 2019 novel coronavirus  -     Symptomatic COVID-19 Virus  (Coronavirus) by PCR    Patient with known exposures at her workplace.  Based on symptoms as noted above, meets symptomatic testing criteria.  Will MyChart with results.  Noted that she should presume she is positive in quarantine in the meantime.       There are no discontinued medications.    Options for treatment and follow-up care were reviewed with the patient. Radha Camargo  engaged in the decision making process and verbalized understanding of the options discussed and agreed with the final plan.    Junior Live, , MA  Pronouns: he/him/his    [facility]/ [facility]   Department of Family Medicine and [facility]

## 2020-11-12 LAB
SARS-COV-2 RNA SPEC QL NAA+PROBE: NOT DETECTED
SPECIMEN SOURCE: NORMAL

## 2021-01-13 ENCOUNTER — MYC MEDICAL ADVICE (OUTPATIENT)
Dept: FAMILY MEDICINE | Facility: CLINIC | Age: 46
End: 2021-01-13

## 2021-01-13 DIAGNOSIS — R53.83 FATIGUE, UNSPECIFIED TYPE: Primary | ICD-10-CM

## 2021-01-13 DIAGNOSIS — Z11.59 ENCOUNTER FOR HEPATITIS C SCREENING TEST FOR LOW RISK PATIENT: ICD-10-CM

## 2021-01-13 NOTE — TELEPHONE ENCOUNTER
Agree with RN reaching out for appointment. I am no longer patient's PCP, seems that she has transferred to [facility]. I would be happy to see her but have quite limited availability- next open is Feb 4. Would suggest seeing person on my Team if she is open.     Junior Live, DO

## 2021-01-14 NOTE — TELEPHONE ENCOUNTER
1/14/2021  11:16 AM    Diagnoses and all orders for this visit:    Fatigue, unspecified type  -     Vitamin D Level; Future    Encounter for hepatitis C screening test for low risk patient  -     Hepatitis C antibody; Future      Vit D level ordered. Pt can have lab only visit ahead of appt. Also ordered routine health maintenance screening that was due. (Hep C once in lifetime screen).    Jnuior Live, DO

## 2021-01-19 ENCOUNTER — OFFICE VISIT (OUTPATIENT)
Dept: FAMILY MEDICINE | Facility: CLINIC | Age: 46
End: 2021-01-19
Payer: COMMERCIAL

## 2021-01-19 VITALS
HEIGHT: 62 IN | SYSTOLIC BLOOD PRESSURE: 120 MMHG | WEIGHT: 160.2 LBS | RESPIRATION RATE: 16 BRPM | TEMPERATURE: 98.4 F | DIASTOLIC BLOOD PRESSURE: 80 MMHG | OXYGEN SATURATION: 100 % | BODY MASS INDEX: 29.48 KG/M2 | HEART RATE: 75 BPM

## 2021-01-19 DIAGNOSIS — N93.9 ABNORMAL UTERINE BLEEDING: ICD-10-CM

## 2021-01-19 DIAGNOSIS — S46.001A ROTATOR CUFF INJURY, RIGHT, INITIAL ENCOUNTER: ICD-10-CM

## 2021-01-19 DIAGNOSIS — Z11.59 ENCOUNTER FOR HEPATITIS C SCREENING TEST FOR LOW RISK PATIENT: ICD-10-CM

## 2021-01-19 DIAGNOSIS — R53.83 FATIGUE, UNSPECIFIED TYPE: Primary | ICD-10-CM

## 2021-01-19 DIAGNOSIS — M75.41 IMPINGEMENT SYNDROME OF SHOULDER REGION, RIGHT: ICD-10-CM

## 2021-01-19 LAB
% GRANULOCYTES: 57.9 %G (ref 40–75)
GRANULOCYTES #: 3.1 K/UL (ref 1.6–8.3)
HCT VFR BLD AUTO: 37.4 % (ref 35–47)
HEMOGLOBIN: 12.4 G/DL (ref 11.7–15.7)
LYMPHOCYTES # BLD AUTO: 1.8 K/UL (ref 0.8–5.3)
LYMPHOCYTES NFR BLD AUTO: 34.1 %L (ref 20–48)
MCH RBC QN AUTO: 30.9 PG (ref 26.5–35)
MCHC RBC AUTO-ENTMCNC: 33.2 G/DL (ref 32–36)
MCV RBC AUTO: 93.3 FL (ref 78–100)
MID #: 0.4 K/UL (ref 0–2.2)
MID %: 8 %M (ref 0–20)
PLATELET # BLD AUTO: 265 K/UL (ref 150–450)
RBC # BLD AUTO: 4.01 M/UL (ref 3.8–5.2)
WBC # BLD AUTO: 5.3 K/UL (ref 4–11)

## 2021-01-19 PROCEDURE — 85025 COMPLETE CBC W/AUTO DIFF WBC: CPT | Performed by: STUDENT IN AN ORGANIZED HEALTH CARE EDUCATION/TRAINING PROGRAM

## 2021-01-19 PROCEDURE — 86803 HEPATITIS C AB TEST: CPT | Performed by: FAMILY MEDICINE

## 2021-01-19 PROCEDURE — 82306 VITAMIN D 25 HYDROXY: CPT | Performed by: FAMILY MEDICINE

## 2021-01-19 PROCEDURE — 84443 ASSAY THYROID STIM HORMONE: CPT | Performed by: FAMILY MEDICINE

## 2021-01-19 PROCEDURE — 36415 COLL VENOUS BLD VENIPUNCTURE: CPT | Performed by: FAMILY MEDICINE

## 2021-01-19 PROCEDURE — 99214 OFFICE O/P EST MOD 30 MIN: CPT | Mod: GC | Performed by: STUDENT IN AN ORGANIZED HEALTH CARE EDUCATION/TRAINING PROGRAM

## 2021-01-19 ASSESSMENT — MIFFLIN-ST. JEOR: SCORE: 1340.04

## 2021-01-19 NOTE — PROGRESS NOTES
Preceptor Attestation:   Patient seen, evaluated and discussed with the resident. I have verified the content of the note, which accurately reflects my assessment of the patient and the plan of care.   Supervising Physician:  Ashley Vitale MD

## 2021-01-19 NOTE — PROGRESS NOTES
Assessment & Plan     Fatigue, unspecified type  Fatigue can be difficult to diagnose and due to many different factors. Start with the following labs thyroid, vitamin D level, and a CBC to check for anemia and will follow up based on lab results  - TSH with free T4 reflex  - CBC with Diff Plt ([facility])  - Vitamin D Level    Encounter for hepatitis C screening test for low risk patient  - Hepatitis C antibody    Impingement syndrome of shoulder region, right  Rotator cuff injury, right, initial encounter  History of repetitive activity with work and exam suggests some impingement of the right shoulder and possible injury to the rotator cuff muscles, specifically subscapularis. A corticosteroid injection could help target a specific area of pain and inflammation however physical therapy will be necessary to help stretch and strengthen the muscles of the arm, shoulder, and back to improve strength, ROM, and provide greater support to the joint. Xrays ordered to evaluate for arthritis or any other issues of the bones or joint.  - [facility] PT[facility] HAND [facility] CH[facility]RACTIC REFERRAL - [facility]  - diclofenac (VOLTAREN) 1 % topical gel  Dispense: 350 g; Refill: 1  - X-ray rt Shoulder G/E 3 vw  - [facility] INTER[facility] REFERRAL    Abnormal uterine bleeding  Radha is a  with 3+ months of abnormal uterine bleeding with a family history of cervical and uterine cancer.There were no obvious lesions on exam today and the vaginal canal and cervix both appear healthy. She has been using a hormonal IUD for several years and prior to this onset of this issue her periods were lighter with use of the IUD compared to prior.  Radha has no known family history of early menopause that might suggest this as a possible cause for her bleeding. Current differential for her bleeding includes uterine leiomyomas, endometrial polyps ovulatory dysfunction, uterine hyperplagia or carcinoma. While it would be unlikely, it is also possible  "that this bleeding relates to the presence of her IUD. At the moment the most concerning cause of her bleeding would be uterine carcinoma and the most likely cause is an undiagnosed leiomyoma, both of which would be best evaluated with a pelvic ultrasound.   - US Pelvic Complete w Transvaginal        Follow up with with films of shoulder and PT prior to seeing Dr. Vitale in Sports Medicine clinic. Follow up with me to review results of pelvic ultrasound and establish next steps.    No follow-ups on file.    Cuca Hernandez MD  [facility] MECCA Garcia is a 45 year old who presents to clinic today for the following health issues    Tired all the time, typiclaly means vitamin D is low. Was on Vitamin D previously which was very helpful for improved energy but stopped it over the summer and has not resumed.     Right shoulder pain \"hurts all the time.\" Has hurt off and on for the past 2 years. 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 Constant pain for the last 7 months. Used to sleep on side but cannot any longer. Clicks and pops and makes all sort of fun noises. Typical for people in her job to have rotator cuff surgery after 5 years- she has been working there for 10 years and has not yet been seen for shoulder issues.     Has had period \"forever\" for the past 3+ months. Used to have spotting monthly at typical intervals. Nothing has changed in pat 3 months that might be cause of bleeding. Has history of multiple cysts (ovarian and uterine) and this does not seem similar because not in pain. History of LEEP procedure approximately 10 years ago. Constant light bleeding with just a few days off (6-7 days) without bleeding in the past 3 months. Has been using Merana IUD for 2-3 years ago.           Musculoskeletal problem/pain  Onset/Duration: 2 years on and off  Description  Location: right shoulder "   Joint Swelling: no   Redness: no  Pain: YES  Warmth: no  Intensity:  severe  Progression of Symptoms:  worsening  Accompanying signs and symptoms:   Fevers: no  Numbness/tingling/weakness: occasionally but not typically  History  Trauma to the area: no  Recent illness:  no  Previous similar problem: no  Previous evaluation:  no  Precipitating or alleviating factors:  Aggravating factors include: overuse  Therapies tried and outcome: heat (not helpful), ice, exercises and Ibuprofen (was previously using ibuprofen more often but not as helpful now. Currently taking 800 mg twice a week to help with sleep    Menstrual Concern  Onset/Duration: 3 months  Description:   Duration of bleeding episodes: 3 weeks of spotting followed by a few days without bleeding then cycles again  Frequency of periods: (1st day of one to 1st day of next):  Constant for 3 weeks followed by ~3 days without bleeding and cycle repeats  Describe bleeding/flow:   Clots:  no   Number of pads/day: 3        Cramping: very to no cramping  Accompanying Signs & Symptoms:  Lightheadedness: no  Temperature intolerance: no  Nosebleeds/Easy bruising: no  Vaginal Discharge: no  Acne: no  Change in body hair: no  History:  Patient's last menstrual period was 12/29/2020 (approximate).  Previous normal periods: YES  Contraceptive use: IUD   Sexually active: YES- same long term partner  Any bleeding after intercourse: YES for the past 6 months. 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 Mother also has history of fibroids and hydadiform mole.        Review of Systems   Constitutional, HEENT, cardiovascular, pulmonary, GI, , musculoskeletal, neuro, skin, endocrine and psych systems are negative, except as otherwise noted.     "  Objective    /80   Pulse 75   Temp 98.4  F (36.9  C) (Oral)   Resp 16   Ht 1.583 m (5' 2.32\")   Wt 72.7 kg (160 lb 3.2 oz)   LMP 12/29/2020 (Approximate)   SpO2 100%   Breastfeeding No   BMI 29.00 kg/m    Body mass index is 29 kg/m .  Physical Exam  Constitutional:       General: She is not in acute distress.     Appearance: She is normal weight. She is not toxic-appearing.   HENT:      Head: Normocephalic and atraumatic.   Eyes:      Extraocular Movements: Extraocular movements intact.      Conjunctiva/sclera: Conjunctivae normal.   Neck:      Musculoskeletal: Normal range of motion.   Cardiovascular:      Rate and Rhythm: Normal rate and regular rhythm.      Pulses: Normal pulses.      Heart sounds: Normal heart sounds.   Pulmonary:      Effort: Pulmonary effort is normal.      Breath sounds: Normal breath sounds. No wheezing, rhonchi or rales.   Abdominal:      General: Abdomen is flat. Bowel sounds are normal.      Palpations: Abdomen is soft.      Tenderness: There is no right CVA tenderness or left CVA tenderness.   Genitourinary:     General: Normal vulva.      Exam position: Supine.      Pubic Area: No rash.       Labia:         Right: No rash, tenderness or lesion.         Left: No rash, tenderness or lesion.       Vagina: Normal. No bleeding.      Cervix: Dilated. No friability or cervical bleeding.      Uterus: Normal.       Adnexa:         Right: No tenderness or fullness.          Left: Tenderness and fullness present.       Comments: IUD strings in place  Musculoskeletal:         General: Tenderness present. No swelling.      Right shoulder: She exhibits tenderness and decreased strength. She exhibits normal range of motion, no swelling, no effusion, no crepitus, no deformity, no laceration and normal pulse.      Comments: Right shoulder exam: No obvious deformity, edema or erythema present. Decreased active and passive abduction due to pain at approximately  degrees. Shoulder " pops with passive abduction at approximately 135 degrees. Positive Neer's sign, positive Hawkin's sign, positive lift-off test, negative empty can test    Skin:     General: Skin is warm and dry.      Capillary Refill: Capillary refill takes less than 2 seconds.   Neurological:      Mental Status: She is alert and oriented to person, place, and time.   Psychiatric:         Mood and Affect: Mood normal.         Behavior: Behavior normal.         Thought Content: Thought content normal.         Judgment: Judgment normal.

## 2021-01-20 LAB
DEPRECATED CALCIDIOL+CALCIFEROL SERPL-MC: 23 UG/L (ref 20–75)
HCV AB SERPL QL IA: NONREACTIVE
TSH SERPL DL<=0.005 MIU/L-ACNC: 2.05 MU/L (ref 0.4–4)

## 2021-01-22 ENCOUNTER — ANCILLARY PROCEDURE (OUTPATIENT)
Dept: ULTRASOUND IMAGING | Facility: CLINIC | Age: 46
End: 2021-01-22
Attending: FAMILY MEDICINE
Payer: COMMERCIAL

## 2021-01-22 ENCOUNTER — MYC MEDICAL ADVICE (OUTPATIENT)
Dept: FAMILY MEDICINE | Facility: CLINIC | Age: 46
End: 2021-01-22

## 2021-01-22 ENCOUNTER — ANCILLARY PROCEDURE (OUTPATIENT)
Dept: GENERAL RADIOLOGY | Facility: CLINIC | Age: 46
End: 2021-01-22
Attending: FAMILY MEDICINE
Payer: COMMERCIAL

## 2021-01-22 DIAGNOSIS — S46.001A ROTATOR CUFF INJURY, RIGHT, INITIAL ENCOUNTER: ICD-10-CM

## 2021-01-22 DIAGNOSIS — M75.41 IMPINGEMENT SYNDROME OF SHOULDER REGION, RIGHT: ICD-10-CM

## 2021-01-22 DIAGNOSIS — N93.9 ABNORMAL UTERINE BLEEDING: ICD-10-CM

## 2021-01-22 PROCEDURE — 73030 X-RAY EXAM OF SHOULDER: CPT | Mod: RT | Performed by: RADIOLOGY

## 2021-01-22 PROCEDURE — 76856 US EXAM PELVIC COMPLETE: CPT | Mod: GC | Performed by: RADIOLOGY

## 2021-01-22 PROCEDURE — 76830 TRANSVAGINAL US NON-OB: CPT | Mod: GC | Performed by: RADIOLOGY

## 2021-01-26 NOTE — PATIENT INSTRUCTIONS
Patient Education   Here is the plan from today's visit    1. Fatigue, unspecified type  Fatigue can be difficult to diagnose and due to many different factors. We will start by checking your thyroid (TSH), vitamin D level, and get a CBC to check for anemia and will follow up based on what your labs show  - TSH with free T4 reflex  - CBC with Diff Plt ([facility])  - Vitamin D Level    2. Encounter for hepatitis C screening test for low risk patient  - Hepatitis C antibody    3. Impingement syndrome of shoulder region, right  4. Rotator cuff injury, right, initial encounter  Your exam suggests some impingement of the right shoulder, however you may also have injury to your rotator cuff. Xrays will help us see if there is any arthritis in the shoulder. Physical therapy will help you develop a better exercise routine to stretch and strengthen the muscles in your back, arm, and shoulder, and provide more support to the area. I would also recommend you follow up with Dr. Vitale in our Sports Medicine clinic for further evaluation and a possible corticosteroid injection.   - [facility]IR[facility]IC REFERRAL - [facility]; Future  - diclofenac (VOLTAREN) 1 % topical gel; Apply 2 g topically 4 times daily  Dispense: 350 g; Refill: 1  - X-ray rt Shoulder G/E 3 vw; Future  - [facility] Clinic[facility] INTERNAL REFERRAL; Future    5. Abnormal uterine bleeding  I was unable to see any obvious causes for your bleeding on exam today. Your vaginal canal and cervix both appear health without any obvious lesions, and your IUD strings are in place in your cervical opening suggesting your IUD is still in the proper position. The next step in evaluating your bleeding is to get a pelvic ultrasound  - US Pelvic Complete w Transvaginal; Future        Please call or return to clinic if your symptoms don't go away.      Thank you for coming to [facility] today.  Lab Testing:  **If you had lab testing today and your results are  reassuring or normal they will be mailed to you or sent through [facility] within 7 days.   **If the lab tests need quick action we will call you with the results.  The phone number we will call with results is # [phone] (home) . If this is not the best number please call our clinic and change the number.  Medication Refills:  If you need any refills please call your pharmacy and they will contact us.   If you need to  your refill at a new pharmacy, please contact the new pharmacy directly. The new pharmacy will help you get your medications transferred faster.   Scheduling:  If you have any concerns about today's visit or wish to schedule another appointment please call our office during normal business hours [phone] (8-5:00 M-F)  If a referral was made to a [facility] and you don't get a call from central scheduling please call [phone].  If a Mammogram was ordered for you at [facility] call [phone] to schedule or change your appointment.  If you had an XRay/CT/Ultrasound/MRI ordered the number is [phone] to schedule or change your radiology appointment.   Medical Concerns:  If you have urgent medical concerns please call [phone] at any time of the day.    Cuca Hernandez MD

## 2021-02-06 NOTE — RESULT ENCOUNTER NOTE
Dear Radha,     Here are your recent lab results- your hepatitis C was negative, your thyroid looks fine and you aren't anemic at all so keep doing what you're doing. Your vitamin D is on the lower side at 23, although you're not technically deficient (<20) I think it's completely reasonable for you to start taking Vitamin D3 again. 600-800 units of D3 a day is a good dose. Let me know if you have any questions or concerns.    Regards,  Cuca Hernandez MD

## 2021-02-06 NOTE — RESULT ENCOUNTER NOTE
Zoltan Garcia,     I hope you've been well since I last saw you. I wanted to reach out to you about your pelvic ultrasound results. In short, everything looks completely normal. The imaging doesn't show any fibroids, polyps, or cysts (more common causes of abnormal bleeding), or anything abnormal with your ovaries or the lining of your uterus, and your IUD is still perfectly in place. Overall this is good news, but of course the downside is we still don't know the cause of your bleeding. I know you've had your IUD for several years, what were your periods were like before you had it? I ask because while the IUD can be great at making periods lighter, that effect can wear off early. So while the IUD is still preventing pregnancy, your bleeding pattern may become more erratic with more breakthrough bleeding after a few years. This is more common to occur if you had very heavy periods prior to your IUD. So that's one thought. If that sounds like you it could be worth considering replacing your IUD early  and seeing if the bleeding resolves, however I don't know how this works with insurance coverage, so that is something to keep in mind if you consider this possibility. In any case, I think it would make sense for us to see each other again for a follow up visit if your bleeding hasn't improved. Let me know if you have any other questions; have a leslie weekend and stay warm!    Regards,  Cuca Hernandez MD

## 2021-03-06 ENCOUNTER — HEALTH MAINTENANCE LETTER (OUTPATIENT)
Age: 46
End: 2021-03-06

## 2021-10-09 ENCOUNTER — HEALTH MAINTENANCE LETTER (OUTPATIENT)
Age: 46
End: 2021-10-09

## 2022-01-29 ENCOUNTER — HEALTH MAINTENANCE LETTER (OUTPATIENT)
Age: 47
End: 2022-01-29

## 2022-03-26 ENCOUNTER — HEALTH MAINTENANCE LETTER (OUTPATIENT)
Age: 47
End: 2022-03-26

## 2022-09-11 ENCOUNTER — HEALTH MAINTENANCE LETTER (OUTPATIENT)
Age: 47
End: 2022-09-11

## 2023-03-24 NOTE — PROGRESS NOTES
"       HPI       Radha Camargo is a 44 year old  who presents for   Chief Complaint   Patient presents with     RECHECK     depression     Depression/Anxiety follow up      Your mood since your last visit: Improved     Medication? Lexapro 10mg     Therapy? Here at [facility]     Exercise? job    What is going well? Coping with life events    Life Stressors:mother's medical dx    Other associated symptoms :None    How is your sleep? Good    New significant life event:Yes-  Mom diagnosis    Current substance use: None    Anxiety / Panic symptoms: No    Occasional nausea win not taken with food      Recent PHQ-9 Scores:     PHQ-9 SCORE 11/8/2019 11/22/2019 12/6/2019   PHQ-9 Total Score 16 10 3     Recent ISIDRO-7 Scores:      ISIDRO-7 SCORE 11/8/2019 11/22/2019 12/6/2019   Total Score 14 6 1         Today' sPHQ 9 Reviewed and it is improved.       Adherence   Medication side effects: as above  How often is a medication missed? Never   +++++++    Problem, Medication and Allergy Lists were reviewed and updated if needed..    Patient is an established patient of this clinic..         Review of Systems:   Review of Systems         Physical Exam:     Vitals:    12/06/19 1101   BP: 123/84   Pulse: 63   Temp: 98  F (36.7  C)   TempSrc: Oral   SpO2: 98%   Weight: 69.5 kg (153 lb 3.2 oz)   Height: 1.588 m (5' 2.5\")     Body mass index is 27.57 kg/m .  Vitals were reviewed and were normal     Physical Exam   General: Alert and oriented, in no acute distress.  Skin: Warm and dry, no abnormalities noted.  Eyes: Extra-ocular muscles intact, pupils equal and reactive.  ENT: Speech intact, nasal passages open, no hearing impairment noted.  CV: No cyanosis or pallor, warm and well perfused.  Respiratory: No respiratory distress, no accessory muscle use.  Neuro: Gait and station normal, comprehension intact. Gross and fine motor skills intact.   Extremities: Warm, able to move all four extremities at will.    MENTAL STATUS EXAM  Appearance: " appropriate  Attitude: cooperative  Behavior: normal  Eye Contact: normal  Speech: normal  Orientation: oriented to person, place, time and situation  Mood: normal  Affect: Congruent with mood  Thought Process: appropriate  Suicidal Ideation: reports no suicidal ideation  Hallucination: denies    Results:   No testing ordered today    Assessment and Plan        Radha was seen today for recheck.    Diagnoses and all orders for this visit:    Major depressive disorder with single episode, in partial remission (H)  -     escitalopram (LEXAPRO) 10 MG tablet; Take 1 tablet (10 mg) by mouth daily    MDD in good control today. Will follow with  and follow up with me in 3 months. Plan to continue lexapro for at least 6 additional months.         Medications Discontinued During This Encounter   Medication Reason     escitalopram (LEXAPRO) 10 MG tablet Reorder       Options for treatment and follow-up care were reviewed with the patient. Radha Camargo  engaged in the decision making process and verbalized understanding of the options discussed and agreed with the final plan.    Junior Live DO, MA  Family Medicine PGY-3  [facility]   Pager: [phone]     ,

## 2023-05-06 ENCOUNTER — HEALTH MAINTENANCE LETTER (OUTPATIENT)
Age: 48
End: 2023-05-06

## 2023-10-05 NOTE — TELEPHONE ENCOUNTER
Left voicemail to confirm appointment has been scheduled 11/22 @ 9:40am.    Kylah Stephenson CMA  [facility]inator     Prior to admit was preparing to turn car on in garage block exhaust and die of CO poisioning.  Prior to admit was preparing to turn car on in garage block exhaust and die of CO poisioning.  Prior to admit was preparing to turn car on in garage block exhaust and die of CO poisioning.  Prior to admit was preparing to turn car on in garage block exhaust and die of CO poisioning.  Prior to admit was preparing to turn car on in garage block exhaust and die of CO poisioning.  Prior to admit was preparing to turn car on in garage block exhaust and die of CO poisioning.  Prior to admit was preparing to turn car on in garage block exhaust and die of CO poisioning.  Prior to admit was preparing to turn car on in garage block exhaust and die of CO poisioning.

## (undated) DEVICE — SUCTION MANIFOLD NEPTUNE 2 SYS 4 PORT 0702-020-000

## (undated) DEVICE — ESU CORD BIPOLAR GREEN 10-4000

## (undated) DEVICE — ESU ELEC BLADE 2.75" COATED/INSULATED E1455

## (undated) DEVICE — SOL NACL 0.9% IRRIG 1000ML BOTTLE 2F7124

## (undated) DEVICE — TUBING SUCTION 12"X1/4" N612

## (undated) DEVICE — ESU GROUND PAD ADULT W/CORD E7507

## (undated) DEVICE — ESU SUCTION CAUTERY 10FR FOOT CONTROL E2505-10FR

## (undated) DEVICE — Device

## (undated) DEVICE — GLOVE PROTEXIS POWDER FREE SMT 8.0  2D72PT80X

## (undated) DEVICE — LINEN TOWEL PACK X5 5464

## (undated) DEVICE — ESU PENCIL W/COATED BLADE E2450H

## (undated) RX ORDER — DEXAMETHASONE SODIUM PHOSPHATE 10 MG/ML
INJECTION, SOLUTION INTRAMUSCULAR; INTRAVENOUS
Status: DISPENSED
Start: 2018-12-28

## (undated) RX ORDER — FENTANYL CITRATE 50 UG/ML
INJECTION, SOLUTION INTRAMUSCULAR; INTRAVENOUS
Status: DISPENSED
Start: 2018-12-28

## (undated) RX ORDER — ACETAMINOPHEN 325 MG/1
TABLET ORAL
Status: DISPENSED
Start: 2018-12-28

## (undated) RX ORDER — LIDOCAINE HYDROCHLORIDE 20 MG/ML
INJECTION, SOLUTION EPIDURAL; INFILTRATION; INTRACAUDAL; PERINEURAL
Status: DISPENSED
Start: 2018-12-28

## (undated) RX ORDER — GLYCOPYRROLATE 0.2 MG/ML
INJECTION INTRAMUSCULAR; INTRAVENOUS
Status: DISPENSED
Start: 2018-12-28

## (undated) RX ORDER — SCOLOPAMINE TRANSDERMAL SYSTEM 1 MG/1
PATCH, EXTENDED RELEASE TRANSDERMAL
Status: DISPENSED
Start: 2018-12-28

## (undated) RX ORDER — BUPIVACAINE HYDROCHLORIDE 2.5 MG/ML
INJECTION, SOLUTION INFILTRATION; PERINEURAL
Status: DISPENSED
Start: 2018-12-28

## (undated) RX ORDER — CEFAZOLIN SODIUM 1 G/3ML
INJECTION, POWDER, FOR SOLUTION INTRAMUSCULAR; INTRAVENOUS
Status: DISPENSED
Start: 2018-12-28

## (undated) RX ORDER — PROPOFOL 10 MG/ML
INJECTION, EMULSION INTRAVENOUS
Status: DISPENSED
Start: 2018-12-28

## (undated) RX ORDER — ONDANSETRON 2 MG/ML
INJECTION INTRAMUSCULAR; INTRAVENOUS
Status: DISPENSED
Start: 2018-12-28

## (undated) RX ORDER — GABAPENTIN 300 MG/1
CAPSULE ORAL
Status: DISPENSED
Start: 2018-12-28